# Patient Record
Sex: MALE | Race: WHITE | Employment: FULL TIME | URBAN - METROPOLITAN AREA
[De-identification: names, ages, dates, MRNs, and addresses within clinical notes are randomized per-mention and may not be internally consistent; named-entity substitution may affect disease eponyms.]

---

## 2021-08-17 ENCOUNTER — TRANSCRIBE ORDER (OUTPATIENT)
Dept: SCHEDULING | Age: 56
End: 2021-08-17

## 2021-08-17 DIAGNOSIS — M16.12 PRIMARY OSTEOARTHRITIS OF LEFT HIP: ICD-10-CM

## 2021-08-17 DIAGNOSIS — M25.552 LEFT HIP PAIN: Primary | ICD-10-CM

## 2021-12-14 ENCOUNTER — TRANSCRIBE ORDER (OUTPATIENT)
Dept: SCHEDULING | Age: 56
End: 2021-12-14

## 2022-01-19 ENCOUNTER — HOSPITAL ENCOUNTER (OUTPATIENT)
Dept: CT IMAGING | Age: 57
Discharge: HOME | End: 2022-01-19
Attending: ORTHOPAEDIC SURGERY
Payer: COMMERCIAL

## 2022-01-19 DIAGNOSIS — M16.12 PRIMARY OSTEOARTHRITIS OF LEFT HIP: ICD-10-CM

## 2022-01-19 DIAGNOSIS — M25.552 LEFT HIP PAIN: ICD-10-CM

## 2022-01-19 PROCEDURE — 73700 CT LOWER EXTREMITY W/O DYE: CPT

## 2022-03-14 ENCOUNTER — HOSPITAL ENCOUNTER (OUTPATIENT)
Dept: PREADMISSION TESTING | Age: 57
Discharge: HOME | End: 2022-03-14
Payer: COMMERCIAL

## 2022-03-14 VITALS
OXYGEN SATURATION: 97 % | TEMPERATURE: 97.5 F | HEIGHT: 71 IN | SYSTOLIC BLOOD PRESSURE: 118 MMHG | WEIGHT: 198.41 LBS | RESPIRATION RATE: 16 BRPM | BODY MASS INDEX: 27.78 KG/M2 | HEART RATE: 54 BPM | DIASTOLIC BLOOD PRESSURE: 74 MMHG

## 2022-03-14 LAB
ABO + RH BLD: NORMAL
ALBUMIN SERPL-MCNC: 4.1 G/DL (ref 3.5–5)
ALBUMIN/GLOB SERPL: 1.2 {RATIO} (ref 1.1–2.2)
ALP SERPL-CCNC: 81 U/L (ref 45–117)
ALT SERPL-CCNC: 42 U/L (ref 12–78)
ANION GAP SERPL CALC-SCNC: 4 MMOL/L (ref 5–15)
APPEARANCE UR: CLEAR
AST SERPL-CCNC: 20 U/L (ref 15–37)
ATRIAL RATE: 47 BPM
BACTERIA URNS QL MICRO: NEGATIVE /HPF
BASOPHILS # BLD: 0.1 K/UL (ref 0–0.1)
BASOPHILS NFR BLD: 1 % (ref 0–1)
BILIRUB SERPL-MCNC: 0.6 MG/DL (ref 0.2–1)
BILIRUB UR QL: NEGATIVE
BLOOD GROUP ANTIBODIES SERPL: NORMAL
BUN SERPL-MCNC: 13 MG/DL (ref 6–20)
BUN/CREAT SERPL: 10 (ref 12–20)
CALCIUM SERPL-MCNC: 9.3 MG/DL (ref 8.5–10.1)
CALCULATED P AXIS, ECG09: 50 DEGREES
CALCULATED R AXIS, ECG10: 10 DEGREES
CALCULATED T AXIS, ECG11: 40 DEGREES
CHLORIDE SERPL-SCNC: 105 MMOL/L (ref 97–108)
CO2 SERPL-SCNC: 32 MMOL/L (ref 21–32)
COLOR UR: ABNORMAL
CREAT SERPL-MCNC: 1.28 MG/DL (ref 0.7–1.3)
CRP SERPL-MCNC: <0.29 MG/DL (ref 0–0.6)
DIAGNOSIS, 93000: NORMAL
DIFFERENTIAL METHOD BLD: NORMAL
EOSINOPHIL # BLD: 0.1 K/UL (ref 0–0.4)
EOSINOPHIL NFR BLD: 1 % (ref 0–7)
EPITH CASTS URNS QL MICRO: ABNORMAL /LPF
ERYTHROCYTE [DISTWIDTH] IN BLOOD BY AUTOMATED COUNT: 12.7 % (ref 11.5–14.5)
ERYTHROCYTE [SEDIMENTATION RATE] IN BLOOD: 10 MM/HR (ref 0–20)
EST. AVERAGE GLUCOSE BLD GHB EST-MCNC: 117 MG/DL
GLOBULIN SER CALC-MCNC: 3.4 G/DL (ref 2–4)
GLUCOSE SERPL-MCNC: 90 MG/DL (ref 65–100)
GLUCOSE UR STRIP.AUTO-MCNC: NEGATIVE MG/DL
HBA1C MFR BLD: 5.7 % (ref 4–5.6)
HCT VFR BLD AUTO: 47 % (ref 36.6–50.3)
HGB BLD-MCNC: 15.2 G/DL (ref 12.1–17)
HGB UR QL STRIP: NEGATIVE
HYALINE CASTS URNS QL MICRO: ABNORMAL /LPF (ref 0–2)
IMM GRANULOCYTES # BLD AUTO: 0 K/UL (ref 0–0.04)
IMM GRANULOCYTES NFR BLD AUTO: 0 % (ref 0–0.5)
KETONES UR QL STRIP.AUTO: ABNORMAL MG/DL
LEUKOCYTE ESTERASE UR QL STRIP.AUTO: NEGATIVE
LYMPHOCYTES # BLD: 1.1 K/UL (ref 0.8–3.5)
LYMPHOCYTES NFR BLD: 21 % (ref 12–49)
MCH RBC QN AUTO: 30.8 PG (ref 26–34)
MCHC RBC AUTO-ENTMCNC: 32.3 G/DL (ref 30–36.5)
MCV RBC AUTO: 95.3 FL (ref 80–99)
MONOCYTES # BLD: 0.5 K/UL (ref 0–1)
MONOCYTES NFR BLD: 10 % (ref 5–13)
NEUTS SEG # BLD: 3.6 K/UL (ref 1.8–8)
NEUTS SEG NFR BLD: 67 % (ref 32–75)
NITRITE UR QL STRIP.AUTO: NEGATIVE
NRBC # BLD: 0 K/UL (ref 0–0.01)
NRBC BLD-RTO: 0 PER 100 WBC
P-R INTERVAL, ECG05: 188 MS
PH UR STRIP: 5.5 [PH] (ref 5–8)
PLATELET # BLD AUTO: 230 K/UL (ref 150–400)
PMV BLD AUTO: 10.3 FL (ref 8.9–12.9)
POTASSIUM SERPL-SCNC: 4 MMOL/L (ref 3.5–5.1)
PROT SERPL-MCNC: 7.5 G/DL (ref 6.4–8.2)
PROT UR STRIP-MCNC: NEGATIVE MG/DL
Q-T INTERVAL, ECG07: 532 MS
QRS DURATION, ECG06: 90 MS
QTC CALCULATION (BEZET), ECG08: 470 MS
RBC # BLD AUTO: 4.93 M/UL (ref 4.1–5.7)
RBC #/AREA URNS HPF: ABNORMAL /HPF (ref 0–5)
SODIUM SERPL-SCNC: 141 MMOL/L (ref 136–145)
SP GR UR REFRACTOMETRY: 1.02 (ref 1–1.03)
SPECIMEN EXP DATE BLD: NORMAL
UA: UC IF INDICATED,UAUC: ABNORMAL
UROBILINOGEN UR QL STRIP.AUTO: 1 EU/DL (ref 0.2–1)
VENTRICULAR RATE, ECG03: 47 BPM
WBC # BLD AUTO: 5.3 K/UL (ref 4.1–11.1)
WBC URNS QL MICRO: ABNORMAL /HPF (ref 0–4)

## 2022-03-14 PROCEDURE — 83036 HEMOGLOBIN GLYCOSYLATED A1C: CPT

## 2022-03-14 PROCEDURE — 86140 C-REACTIVE PROTEIN: CPT

## 2022-03-14 PROCEDURE — 81001 URINALYSIS AUTO W/SCOPE: CPT

## 2022-03-14 PROCEDURE — 86900 BLOOD TYPING SEROLOGIC ABO: CPT

## 2022-03-14 PROCEDURE — 36415 COLL VENOUS BLD VENIPUNCTURE: CPT

## 2022-03-14 PROCEDURE — 93005 ELECTROCARDIOGRAM TRACING: CPT

## 2022-03-14 PROCEDURE — 85025 COMPLETE CBC W/AUTO DIFF WBC: CPT

## 2022-03-14 PROCEDURE — 85652 RBC SED RATE AUTOMATED: CPT

## 2022-03-14 PROCEDURE — 84466 ASSAY OF TRANSFERRIN: CPT

## 2022-03-14 PROCEDURE — 80053 COMPREHEN METABOLIC PANEL: CPT

## 2022-03-14 RX ORDER — LEVOTHYROXINE SODIUM 50 UG/1
75 TABLET ORAL
COMMUNITY

## 2022-03-14 RX ORDER — LOSARTAN POTASSIUM 25 MG/1
25 TABLET ORAL EVERY MORNING
COMMUNITY

## 2022-03-14 RX ORDER — AMIODARONE HYDROCHLORIDE 200 MG/1
200 TABLET ORAL 2 TIMES DAILY
COMMUNITY

## 2022-03-14 NOTE — H&P
History and Physical    Patient: Ericka Disla MRN: [id number]  SSN: [id number]    YOB: 1965  Age: 59 y.o. Sex: male      CC: Left hip pain    Subjective:      Ericka Disla is a 59 y.o. male referred for pre-operative evaluation by Dr. Dontrell Fleming for surgery on 3/25/22. Gloria Ellakeren notes he has had hip pain for at least two years. He does yoga and is an avid golfer. His hip pain is interfering with both activities. Pain is in his groin and hip. Denies falls. He has tried and failed conservative measures. Pain Scale: 3/10    He is followed by Dr. Meron Worthington for his hx of AFIB. He is well controlled and is taking Xarelto. He notes last visit was in December. The patient was evaluated in the surgeon's office and it was determined that the most appropriate plan of care is to proceed with surgical intervention. Patient's PCP Supriya Marrufo MD    Past Medical History:   Diagnosis Date    A-fib [facility])     Anticoagulated     Xarelto- followed by Dr. Wendy Lombardi COVID-19 01/24/2022    COVID-19 vaccine series completed     Plus booster    HTN (hypertension)     Hypothyroidism     Seizure ([facility] [address] 2010    None since- No known cause     Past Surgical History:   Procedure Laterality Date    HX HERNIA REPAIR      HX VASECTOMY        No family history on file. Social History     Tobacco Use    Smoking status: Never Smoker    Smokeless tobacco: Never Used   Substance Use Topics    Alcohol use: Yes     Alcohol/week: 3.0 standard drinks     Types: 3 Cans of beer per week    Drug use: No      Prior to Admission medications    Medication Sig Start Date End Date Taking? Authorizing Provider   amiodarone (CORDARONE) 200 mg tablet Take 200 mg by mouth two (2) times a day. Yes Provider, Historical   losartan (COZAAR) 25 mg tablet Take 25 mg by mouth Every morning. Yes Provider, Historical   MULTIVITAMIN PO Take 1 Tablet by mouth every other day.    Yes Provider, Historical   rivaroxaban (XARELTO) 20 mg tab tablet Take 20 mg by mouth daily (with breakfast). Yes Provider, Historical   levothyroxine (SYNTHROID) 50 mcg tablet Take 50 mcg by mouth Daily (before breakfast). Yes Provider, Historical        No Known Allergies    Review of Systems:  Constitutional: Negative for chills and fever  HENT: Negative for congestion and sore throat  Eyes: negative for blurred vision and double vision  Respiratory: Negative for cough, shortness of breath and wheezing  Mouth: Negative for loose, broken or chipped teeth. Cardiovascular: Negative for chest pain and palpitations  Gastrointestinal: Negative for abdominal pain, constipation, diarrhea and nausea  Genitourinary: Negative for dysuria and hematuria  Musculoskeletal: Left hip pain  Skin: Negative for rash, open wounds.    Neurological: Negative for dizziness, tremors and headaches  Psychiatric: Negative for anxiety    Objective:     Vitals:    03/14/22 0823   BP: 118/74   Pulse: (!) 54   Resp: 16   Temp: 97.5 °F (36.4 °C)   SpO2: 97%   Weight: 90 kg (198 lb 6.6 oz)   Height: 5' 11\" (1.803 m)        Physical Exam:  General Appearance: Alert, Well Appearing and in no distress  Mental Status: Normal mood, behavior, speech and dress  Neck: Normal appearance externally  Ears: External canal no drainage  Nose: Normal external appearance and no drainage   Chest: Clear to auscultation, no wheezes, rales or rhonchi  Heart: Bradycardia, regular rhythm, no murmurs, rubs, clicks or gallops  Skin: Normal color, no lesions externally  Abdomen: Not examined  Neuro: Not examined  Musculoskeletal: Gait antalgic    Mets >4- plays golf and does yoga    Recent Results (from the past 168 hour(s))   C REACTIVE PROTEIN, QT    Collection Time: 03/14/22  9:12 AM   Result Value Ref Range    C-Reactive protein <0.29 0.00 - 0.60 mg/dL   CBC WITH AUTOMATED DIFF    Collection Time: 03/14/22  9:12 AM   Result Value Ref Range    WBC 5.3 4.1 - 11.1 K/uL    RBC 4.93 4.10 - 5.70 M/uL HGB 15.2 12.1 - 17.0 g/dL    HCT 47.0 36.6 - 50.3 %    MCV 95.3 80.0 - 99.0 FL    MCH 30.8 26.0 - 34.0 PG    MCHC 32.3 30.0 - 36.5 g/dL    RDW 12.7 11.5 - 14.5 %    PLATELET 165 953 - 365 K/uL    MPV 10.3 8.9 - 12.9 FL    NRBC 0.0 0  WBC    ABSOLUTE NRBC 0.00 0.00 - 0.01 K/uL    NEUTROPHILS 67 32 - 75 %    LYMPHOCYTES 21 12 - 49 %    MONOCYTES 10 5 - 13 %    EOSINOPHILS 1 0 - 7 %    BASOPHILS 1 0 - 1 %    IMMATURE GRANULOCYTES 0 0.0 - 0.5 %    ABS. NEUTROPHILS 3.6 1.8 - 8.0 K/UL    ABS. LYMPHOCYTES 1.1 0.8 - 3.5 K/UL    ABS. MONOCYTES 0.5 0.0 - 1.0 K/UL    ABS. EOSINOPHILS 0.1 0.0 - 0.4 K/UL    ABS. BASOPHILS 0.1 0.0 - 0.1 K/UL    ABS. IMM. GRANS. 0.0 0.00 - 0.04 K/UL    DF AUTOMATED     METABOLIC PANEL, COMPREHENSIVE    Collection Time: 03/14/22  9:12 AM   Result Value Ref Range    Sodium 141 136 - 145 mmol/L    Potassium 4.0 3.5 - 5.1 mmol/L    Chloride 105 97 - 108 mmol/L    CO2 32 21 - 32 mmol/L    Anion gap 4 (L) 5 - 15 mmol/L    Glucose 90 65 - 100 mg/dL    BUN 13 6 - 20 MG/DL    Creatinine 1.28 0.70 - 1.30 MG/DL    BUN/Creatinine ratio 10 (L) 12 - 20      GFR est AA >60 >60 ml/min/1.73m2    GFR est non-AA 58 (L) >60 ml/min/1.73m2    Calcium 9.3 8.5 - 10.1 MG/DL    Bilirubin, total 0.6 0.2 - 1.0 MG/DL    ALT (SGPT) 42 12 - 78 U/L    AST (SGOT) 20 15 - 37 U/L    Alk.  phosphatase 81 45 - 117 U/L    Protein, total 7.5 6.4 - 8.2 g/dL    Albumin 4.1 3.5 - 5.0 g/dL    Globulin 3.4 2.0 - 4.0 g/dL    A-G Ratio 1.2 1.1 - 2.2     HEMOGLOBIN A1C WITH EAG    Collection Time: 03/14/22  9:12 AM   Result Value Ref Range    Hemoglobin A1c 5.7 (H) 4.0 - 5.6 %    Est. average glucose 117 mg/dL   CULTURE, MRSA    Collection Time: 03/14/22  9:12 AM    Specimen: Nares; Nasal   Result Value Ref Range    Special Requests: NO SPECIAL REQUESTS      Culture result: MRSA NOT PRESENT      Culture result:        Screening of patient nares for MRSA is for surveillance purposes and, if positive, to facilitate isolation considerations in high risk settings. It is not intended for automatic decolonization interventions per se as regimens are not sufficiently effective to warrant routine use. SED RATE (ESR)    Collection Time: 03/14/22  9:12 AM   Result Value Ref Range    Sed rate, automated 10 0 - 20 mm/hr   TRANSFERRIN    Collection Time: 03/14/22  9:12 AM   Result Value Ref Range    Transferrin 230 177 - 329 mg/dL   URINALYSIS W/ REFLEX CULTURE    Collection Time: 03/14/22  9:12 AM    Specimen: Urine   Result Value Ref Range    Color YELLOW/STRAW      Appearance CLEAR CLEAR      Specific gravity 1.021 1.003 - 1.030      pH (UA) 5.5 5.0 - 8.0      Protein Negative NEG mg/dL    Glucose Negative NEG mg/dL    Ketone TRACE (A) NEG mg/dL    Bilirubin Negative NEG      Blood Negative NEG      Urobilinogen 1.0 0.2 - 1.0 EU/dL    Nitrites Negative NEG      Leukocyte Esterase Negative NEG      UA:UC IF INDICATED CULTURE NOT INDICATED BY UA RESULT CNI      WBC 0-4 0 - 4 /hpf    RBC 0-5 0 - 5 /hpf    Epithelial cells FEW FEW /lpf    Bacteria Negative NEG /hpf    Hyaline cast 0-2 0 - 2 /lpf   TYPE & SCREEN    Collection Time: 03/14/22  9:12 AM   Result Value Ref Range    Crossmatch Expiration 03/28/2022,2359     ABO/Rh(D) Carri Ricks POSITIVE     Antibody screen NEG    EKG, 12 LEAD, INITIAL    Collection Time: 03/14/22  9:38 AM   Result Value Ref Range    Ventricular Rate 47 BPM    Atrial Rate 47 BPM    P-R Interval 188 ms    QRS Duration 90 ms    Q-T Interval 532 ms    QTC Calculation (Bezet) 470 ms    Calculated P Axis 50 degrees    Calculated R Axis 10 degrees    Calculated T Axis 40 degrees    Diagnosis       Sinus bradycardia  Otherwise normal ECG  When compared with ECG of 7/22/13  No significant change  Confirmed by Roselia Bowen MD., Justin Marmolejo ([id number]) on 3/14/2022 9:02:53 PM           Assessment:     OA Left Hip  Hx of AFIB on Xarelto  Pre-Operative Evaluation    Plan:     LTH  MRSA negative  Labs and EKG reviewed.      Xarelto: I have asked Annette Almonte to hold his Xarelto three days prior to surgery and we will verify with cardiology.        Signed By: Sulaiman Chambers NP     March 16, 2022

## 2022-03-14 NOTE — PERIOP NOTES
[address] [address] [facility]     PRE-ADMISSION TESTING    [phone]   Surgery Date: 3/25/22     Is surgery arrival time given by surgeon? NO  If NO, [address] [facility] staff will call you between 3 and 7pm the day before your surgery with your arrival time. (If your surgery is on a Monday, we will call you the Friday before.)    Call [phone] after 7pm Monday-Friday if you did not receive this call. INSTRUCTIONS BEFORE YOUR SURGERY   When You  Arrive Arrive at the [address] [facility] on the day of your surgery  Have your insurance card, photo ID, and any copayment (if needed)   Food   and   Drink NO food or drink after midnight the night before surgery    This means NO water, gum, mints, coffee, juice, etc.  No alcohol (beer, wine, liquor) 24 hours before and after surgery   Medications to   TAKE   Morning of Surgery MEDICATIONS TO TAKE THE MORNING OF SURGERY WITH A SIP OF WATER:    Amiodarone    Levothyroxine    Medications  To  STOP      7 days before surgery  Non-Steroidal anti-inflammatory Drugs (NSAID's): for example, Ibuprofen (Advil, Motrin), Naproxen (Aleve)   Aspirin, if taking for pain    Herbal supplements, vitamins, and fish oil   Other:  (Pain medications not listed above, including Tylenol may be taken)   Blood  Thinners   Stop Xarelto 3 days prior to surgery per our NP Deepak Thayer. We will discuss with your cardiologist and call you if he advises otherwise. Bathing Clothing  Jewelry  Valuables      If you shower the morning of surgery, please do not apply anything to your skin (lotions, powders, deodorant, or makeup, especially mascara)   Follow Chlorhexidine [employer] body wash instructions provided to you during PAT appointment. Begin 3 days prior to surgery.    Do not shave or trim anywhere 24 hours before surgery   Wear your hair loose or down; no pony-tails, buns, or metal hair clips   Wear loose, comfortable, clean clothes   Wear glasses instead of contacts  [address], valuables, and jewelry, including body piercings, at home   If you were given an [employer], bring it on day of surgery. Going Home - or Spending the Night  SAME-DAY SURGERY: You must have a responsible adult drive you home and stay with you 24 hours after surgery   ADMITS: If your doctor is keeping you in the hospital after surgery, leave personal belongings/luggage in your car until you have a hospital room number. Hospital discharge time is 12 noon  Drivers must be here before 12 noon unless you are told differently   Special Instructions        Follow all instructions so your surgery wont be cancelled. Please, be on time. If a situation occurs and you are delayed the day of surgery, call [phone] or [phone]. If your physical condition changes (like a fever, cold, flu, etc.) call your surgeon. Home medication(s) reviewed and verified verbally with list  during PAT appointment. The patient was contacted by in person    The patient verbalizes understanding of all instructions and does not need reinforcement.

## 2022-03-15 LAB
BACTERIA SPEC CULT: NORMAL
BACTERIA SPEC CULT: NORMAL
SERVICE CMNT-IMP: NORMAL
TRANSFERRIN SERPL-MCNC: 230 MG/DL (ref 177–329)

## 2022-03-22 NOTE — PERIOP NOTES
Xarelto plan received from Dr. Nguyen Clock office. Notified patient to hold Xarelto - verbalized understanding. Will restart after surgery per surgeon instructions.

## 2022-03-24 ENCOUNTER — ANESTHESIA EVENT (OUTPATIENT)
Dept: SURGERY | Age: 57
End: 2022-03-24
Payer: COMMERCIAL

## 2022-03-25 ENCOUNTER — APPOINTMENT (OUTPATIENT)
Dept: GENERAL RADIOLOGY | Age: 57
End: 2022-03-25
Attending: PHYSICIAN ASSISTANT
Payer: COMMERCIAL

## 2022-03-25 ENCOUNTER — HOSPITAL ENCOUNTER (OUTPATIENT)
Age: 57
Setting detail: OBSERVATION
Discharge: HOME | End: 2022-03-26
Attending: ORTHOPAEDIC SURGERY | Admitting: ORTHOPAEDIC SURGERY
Payer: COMMERCIAL

## 2022-03-25 ENCOUNTER — ANESTHESIA (OUTPATIENT)
Dept: SURGERY | Age: 57
End: 2022-03-25
Payer: COMMERCIAL

## 2022-03-25 DIAGNOSIS — M16.12 ARTHRITIS OF LEFT HIP: Primary | ICD-10-CM

## 2022-03-25 DIAGNOSIS — I48.91 ATRIAL FIBRILLATION, UNSPECIFIED TYPE (HCC): ICD-10-CM

## 2022-03-25 DIAGNOSIS — I95.1 ORTHOSTATIC HYPOTENSION: ICD-10-CM

## 2022-03-25 PROCEDURE — 74011250637 HC RX REV CODE- 250/637: Performed by: PHYSICIAN ASSISTANT

## 2022-03-25 PROCEDURE — 74011250636 HC RX REV CODE- 250/636: Performed by: ANESTHESIOLOGY

## 2022-03-25 PROCEDURE — 74011000250 HC RX REV CODE- 250: Performed by: ORTHOPAEDIC SURGERY

## 2022-03-25 PROCEDURE — G0378 HOSPITAL OBSERVATION PER HR: HCPCS

## 2022-03-25 PROCEDURE — 2709999900 HC NON-CHARGEABLE SUPPLY: Performed by: ORTHOPAEDIC SURGERY

## 2022-03-25 PROCEDURE — 76210000036 HC AMBSU PH I REC 1.5 TO 2 HR: Performed by: ORTHOPAEDIC SURGERY

## 2022-03-25 PROCEDURE — 74011000250 HC RX REV CODE- 250: Performed by: NURSE ANESTHETIST, CERTIFIED REGISTERED

## 2022-03-25 PROCEDURE — 74011250636 HC RX REV CODE- 250/636: Performed by: ORTHOPAEDIC SURGERY

## 2022-03-25 PROCEDURE — 77030040922 HC BLNKT HYPOTHRM STRY -A

## 2022-03-25 PROCEDURE — 77030013079 HC BLNKT BAIR HGGR 3M -A: Performed by: NURSE ANESTHETIST, CERTIFIED REGISTERED

## 2022-03-25 PROCEDURE — 74011250636 HC RX REV CODE- 250/636: Performed by: NURSE ANESTHETIST, CERTIFIED REGISTERED

## 2022-03-25 PROCEDURE — 74011000272 HC RX REV CODE- 272: Performed by: ORTHOPAEDIC SURGERY

## 2022-03-25 PROCEDURE — 97161 PT EVAL LOW COMPLEX 20 MIN: CPT

## 2022-03-25 PROCEDURE — 77030040361 HC SLV COMPR DVT MDII -B

## 2022-03-25 PROCEDURE — 77030020788: Performed by: ORTHOPAEDIC SURGERY

## 2022-03-25 PROCEDURE — 77030018723 HC ELCTRD BLD COVD -A: Performed by: ORTHOPAEDIC SURGERY

## 2022-03-25 PROCEDURE — 77030006835 HC BLD SAW SAG STRY -B: Performed by: ORTHOPAEDIC SURGERY

## 2022-03-25 PROCEDURE — 74011250636 HC RX REV CODE- 250/636: Performed by: PHYSICIAN ASSISTANT

## 2022-03-25 PROCEDURE — 77030010507 HC ADH SKN DERMBND J&J -B: Performed by: ORTHOPAEDIC SURGERY

## 2022-03-25 PROCEDURE — C1776 JOINT DEVICE (IMPLANTABLE): HCPCS | Performed by: ORTHOPAEDIC SURGERY

## 2022-03-25 PROCEDURE — 97530 THERAPEUTIC ACTIVITIES: CPT

## 2022-03-25 PROCEDURE — 76030000021 HC AMB SURG 2 TO 2.5 HR INTENSV-TIER 1: Performed by: ORTHOPAEDIC SURGERY

## 2022-03-25 PROCEDURE — 77030031139 HC SUT VCRL2 J&J -A: Performed by: ORTHOPAEDIC SURGERY

## 2022-03-25 PROCEDURE — 77030007866 HC KT SPN ANES BBMI -B: Performed by: ANESTHESIOLOGY

## 2022-03-25 PROCEDURE — 74011000250 HC RX REV CODE- 250: Performed by: ANESTHESIOLOGY

## 2022-03-25 PROCEDURE — 51798 US URINE CAPACITY MEASURE: CPT

## 2022-03-25 PROCEDURE — 77030035236 HC SUT PDS STRATFX BARB J&J -B: Performed by: ORTHOPAEDIC SURGERY

## 2022-03-25 PROCEDURE — 77030029829 HC TIB INST CKPNT DISP STRY -B: Performed by: ORTHOPAEDIC SURGERY

## 2022-03-25 PROCEDURE — 77030041075 HC DRSG AG OPTIFRM MDII -B: Performed by: ORTHOPAEDIC SURGERY

## 2022-03-25 PROCEDURE — 77030029821: Performed by: ORTHOPAEDIC SURGERY

## 2022-03-25 PROCEDURE — 77030002933 HC SUT MCRYL J&J -A: Performed by: ORTHOPAEDIC SURGERY

## 2022-03-25 PROCEDURE — 76060000064 HC AMB SURG ANES 2 TO 2.5 HR: Performed by: ORTHOPAEDIC SURGERY

## 2022-03-25 PROCEDURE — 74011250637 HC RX REV CODE- 250/637: Performed by: ANESTHESIOLOGY

## 2022-03-25 PROCEDURE — 74011000250 HC RX REV CODE- 250: Performed by: PHYSICIAN ASSISTANT

## 2022-03-25 PROCEDURE — 74011250637 HC RX REV CODE- 250/637: Performed by: ORTHOPAEDIC SURGERY

## 2022-03-25 PROCEDURE — 77030036660

## 2022-03-25 PROCEDURE — 77030038023 HC PIN FIX MAKO STRY -B: Performed by: ORTHOPAEDIC SURGERY

## 2022-03-25 PROCEDURE — 72170 X-RAY EXAM OF PELVIS: CPT

## 2022-03-25 DEVICE — 6.5MM LOW PROFILE HEX SCREW 30MM
Type: IMPLANTABLE DEVICE | Site: HIP | Status: FUNCTIONAL
Brand: TRIDENT II

## 2022-03-25 DEVICE — LINER- CEMENTLESS
Type: IMPLANTABLE DEVICE | Site: HIP | Status: FUNCTIONAL
Brand: MDM

## 2022-03-25 DEVICE — CERAMIC V40 FEMORAL HEAD
Type: IMPLANTABLE DEVICE | Site: HIP | Status: FUNCTIONAL
Brand: BIOLOX

## 2022-03-25 DEVICE — X3 INSERT FOR ADM/ MDM
Type: IMPLANTABLE DEVICE | Site: HIP | Status: FUNCTIONAL
Brand: X3

## 2022-03-25 DEVICE — HIP H3 TOT ADV DUAL MOB -- IMPL CAPPED H3: Type: IMPLANTABLE DEVICE | Status: FUNCTIONAL

## 2022-03-25 DEVICE — TRIDENT II TRITANIUM CLUSTER 54E
Type: IMPLANTABLE DEVICE | Site: HIP | Status: FUNCTIONAL
Brand: TRIDENT II

## 2022-03-25 DEVICE — 127 DEGREE NECK ANGLE HIP STEM
Type: IMPLANTABLE DEVICE | Site: HIP | Status: FUNCTIONAL
Brand: ACCOLADE

## 2022-03-25 RX ORDER — OXYCODONE HCL 10 MG/1
20 TABLET, FILM COATED, EXTENDED RELEASE ORAL ONCE
Status: COMPLETED | OUTPATIENT
Start: 2022-03-25 | End: 2022-03-25

## 2022-03-25 RX ORDER — OXYCODONE HYDROCHLORIDE 5 MG/1
10 TABLET ORAL
Status: DISCONTINUED | OUTPATIENT
Start: 2022-03-25 | End: 2022-03-26 | Stop reason: HOSPADM

## 2022-03-25 RX ORDER — HYDROMORPHONE HYDROCHLORIDE 1 MG/ML
.25-1 INJECTION, SOLUTION INTRAMUSCULAR; INTRAVENOUS; SUBCUTANEOUS
Status: DISCONTINUED | OUTPATIENT
Start: 2022-03-25 | End: 2022-03-25 | Stop reason: HOSPADM

## 2022-03-25 RX ORDER — SODIUM CHLORIDE, SODIUM LACTATE, POTASSIUM CHLORIDE, CALCIUM CHLORIDE 600; 310; 30; 20 MG/100ML; MG/100ML; MG/100ML; MG/100ML
100 INJECTION, SOLUTION INTRAVENOUS CONTINUOUS
Status: DISCONTINUED | OUTPATIENT
Start: 2022-03-25 | End: 2022-03-25 | Stop reason: HOSPADM

## 2022-03-25 RX ORDER — ACETAMINOPHEN 500 MG
975 TABLET ORAL
Qty: 60 TABLET | Refills: 1 | Status: SHIPPED | OUTPATIENT
Start: 2022-03-25

## 2022-03-25 RX ORDER — ACETAMINOPHEN 325 MG/1
650 TABLET ORAL EVERY 6 HOURS
Status: DISCONTINUED | OUTPATIENT
Start: 2022-03-25 | End: 2022-03-26 | Stop reason: HOSPADM

## 2022-03-25 RX ORDER — ONDANSETRON 8 MG/1
4 TABLET, ORALLY DISINTEGRATING ORAL
Qty: 30 TABLET | Refills: 0 | Status: SHIPPED | OUTPATIENT
Start: 2022-03-25

## 2022-03-25 RX ORDER — FACIAL-BODY WIPES
10 EACH TOPICAL DAILY PRN
Status: DISCONTINUED | OUTPATIENT
Start: 2022-03-27 | End: 2022-03-26 | Stop reason: HOSPADM

## 2022-03-25 RX ORDER — OXYCODONE HYDROCHLORIDE 5 MG/1
10 TABLET ORAL
Status: DISCONTINUED | OUTPATIENT
Start: 2022-03-25 | End: 2022-03-25 | Stop reason: HOSPADM

## 2022-03-25 RX ORDER — SODIUM CHLORIDE, SODIUM LACTATE, POTASSIUM CHLORIDE, CALCIUM CHLORIDE 600; 310; 30; 20 MG/100ML; MG/100ML; MG/100ML; MG/100ML
125 INJECTION, SOLUTION INTRAVENOUS CONTINUOUS
Status: DISCONTINUED | OUTPATIENT
Start: 2022-03-25 | End: 2022-03-25 | Stop reason: HOSPADM

## 2022-03-25 RX ORDER — BUPIVACAINE HYDROCHLORIDE 5 MG/ML
INJECTION, SOLUTION EPIDURAL; INTRACAUDAL AS NEEDED
Status: DISCONTINUED | OUTPATIENT
Start: 2022-03-25 | End: 2022-03-25 | Stop reason: HOSPADM

## 2022-03-25 RX ORDER — DIPHENHYDRAMINE HYDROCHLORIDE 50 MG/ML
12.5 INJECTION, SOLUTION INTRAMUSCULAR; INTRAVENOUS AS NEEDED
Status: DISCONTINUED | OUTPATIENT
Start: 2022-03-25 | End: 2022-03-25 | Stop reason: HOSPADM

## 2022-03-25 RX ORDER — LIDOCAINE HYDROCHLORIDE 20 MG/ML
INJECTION, SOLUTION INFILTRATION; PERINEURAL AS NEEDED
Status: DISCONTINUED | OUTPATIENT
Start: 2022-03-25 | End: 2022-03-25 | Stop reason: HOSPADM

## 2022-03-25 RX ORDER — OXYCODONE HYDROCHLORIDE 5 MG/1
5 TABLET ORAL AS NEEDED
Status: DISCONTINUED | OUTPATIENT
Start: 2022-03-25 | End: 2022-03-25 | Stop reason: HOSPADM

## 2022-03-25 RX ORDER — HYDROMORPHONE HYDROCHLORIDE 1 MG/ML
0.5 INJECTION, SOLUTION INTRAMUSCULAR; INTRAVENOUS; SUBCUTANEOUS
Status: ACTIVE | OUTPATIENT
Start: 2022-03-25 | End: 2022-03-26

## 2022-03-25 RX ORDER — ONDANSETRON 2 MG/ML
4 INJECTION INTRAMUSCULAR; INTRAVENOUS
Status: ACTIVE | OUTPATIENT
Start: 2022-03-25 | End: 2022-03-26

## 2022-03-25 RX ORDER — SODIUM CHLORIDE 0.9 % (FLUSH) 0.9 %
5-40 SYRINGE (ML) INJECTION EVERY 8 HOURS
Status: DISCONTINUED | OUTPATIENT
Start: 2022-03-25 | End: 2022-03-26 | Stop reason: HOSPADM

## 2022-03-25 RX ORDER — FENTANYL CITRATE 50 UG/ML
25 INJECTION, SOLUTION INTRAMUSCULAR; INTRAVENOUS
Status: DISCONTINUED | OUTPATIENT
Start: 2022-03-25 | End: 2022-03-25 | Stop reason: HOSPADM

## 2022-03-25 RX ORDER — FAMOTIDINE 20 MG/1
20 TABLET, FILM COATED ORAL 2 TIMES DAILY
Status: DISCONTINUED | OUTPATIENT
Start: 2022-03-25 | End: 2022-03-26 | Stop reason: HOSPADM

## 2022-03-25 RX ORDER — PROPOFOL 10 MG/ML
INJECTION, EMULSION INTRAVENOUS
Status: DISCONTINUED | OUTPATIENT
Start: 2022-03-25 | End: 2022-03-25 | Stop reason: HOSPADM

## 2022-03-25 RX ORDER — FLUMAZENIL 0.1 MG/ML
0.2 INJECTION INTRAVENOUS
Status: DISCONTINUED | OUTPATIENT
Start: 2022-03-25 | End: 2022-03-25 | Stop reason: HOSPADM

## 2022-03-25 RX ORDER — SODIUM CHLORIDE 0.9 % (FLUSH) 0.9 %
5-40 SYRINGE (ML) INJECTION AS NEEDED
Status: DISCONTINUED | OUTPATIENT
Start: 2022-03-25 | End: 2022-03-25 | Stop reason: HOSPADM

## 2022-03-25 RX ORDER — LIDOCAINE HYDROCHLORIDE 10 MG/ML
0.1 INJECTION, SOLUTION EPIDURAL; INFILTRATION; INTRACAUDAL; PERINEURAL AS NEEDED
Status: DISCONTINUED | OUTPATIENT
Start: 2022-03-25 | End: 2022-03-25 | Stop reason: HOSPADM

## 2022-03-25 RX ORDER — NALOXONE HYDROCHLORIDE 0.4 MG/ML
0.2 INJECTION, SOLUTION INTRAMUSCULAR; INTRAVENOUS; SUBCUTANEOUS
Status: DISCONTINUED | OUTPATIENT
Start: 2022-03-25 | End: 2022-03-25 | Stop reason: HOSPADM

## 2022-03-25 RX ORDER — OXYCODONE HYDROCHLORIDE 5 MG/1
5 TABLET ORAL
Status: DISCONTINUED | OUTPATIENT
Start: 2022-03-25 | End: 2022-03-26 | Stop reason: HOSPADM

## 2022-03-25 RX ORDER — TRANEXAMIC ACID 100 MG/ML
INJECTION, SOLUTION INTRAVENOUS AS NEEDED
Status: DISCONTINUED | OUTPATIENT
Start: 2022-03-25 | End: 2022-03-25 | Stop reason: HOSPADM

## 2022-03-25 RX ORDER — AMOXICILLIN 250 MG
1 CAPSULE ORAL 2 TIMES DAILY
Status: DISCONTINUED | OUTPATIENT
Start: 2022-03-25 | End: 2022-03-26 | Stop reason: HOSPADM

## 2022-03-25 RX ORDER — NALOXONE HYDROCHLORIDE 0.4 MG/ML
0.4 INJECTION, SOLUTION INTRAMUSCULAR; INTRAVENOUS; SUBCUTANEOUS AS NEEDED
Status: DISCONTINUED | OUTPATIENT
Start: 2022-03-25 | End: 2022-03-26 | Stop reason: HOSPADM

## 2022-03-25 RX ORDER — OXYCODONE HYDROCHLORIDE 5 MG/1
5 TABLET ORAL
Qty: 30 TABLET | Refills: 0 | Status: SHIPPED | OUTPATIENT
Start: 2022-03-25 | End: 2022-04-01

## 2022-03-25 RX ORDER — SODIUM CHLORIDE 0.9 % (FLUSH) 0.9 %
5-40 SYRINGE (ML) INJECTION AS NEEDED
Status: DISCONTINUED | OUTPATIENT
Start: 2022-03-25 | End: 2022-03-26 | Stop reason: HOSPADM

## 2022-03-25 RX ORDER — POVIDONE-IODINE 10 %
SOLUTION, NON-ORAL TOPICAL AS NEEDED
Status: DISCONTINUED | OUTPATIENT
Start: 2022-03-25 | End: 2022-03-25 | Stop reason: HOSPADM

## 2022-03-25 RX ORDER — ACETAMINOPHEN 325 MG/1
975 TABLET ORAL ONCE
Status: COMPLETED | OUTPATIENT
Start: 2022-03-25 | End: 2022-03-25

## 2022-03-25 RX ORDER — DIPHENHYDRAMINE HYDROCHLORIDE 50 MG/ML
12.5 INJECTION, SOLUTION INTRAMUSCULAR; INTRAVENOUS
Status: ACTIVE | OUTPATIENT
Start: 2022-03-25 | End: 2022-03-26

## 2022-03-25 RX ORDER — SODIUM CHLORIDE 9 MG/ML
125 INJECTION, SOLUTION INTRAVENOUS CONTINUOUS
Status: DISCONTINUED | OUTPATIENT
Start: 2022-03-25 | End: 2022-03-26

## 2022-03-25 RX ORDER — TRAMADOL HYDROCHLORIDE 50 MG/1
50 TABLET ORAL
Qty: 28 TABLET | Refills: 0 | Status: SHIPPED | OUTPATIENT
Start: 2022-03-25 | End: 2022-04-01

## 2022-03-25 RX ORDER — PREGABALIN 75 MG/1
75 CAPSULE ORAL
Qty: 30 CAPSULE | Refills: 0 | Status: SHIPPED | OUTPATIENT
Start: 2022-03-25

## 2022-03-25 RX ORDER — FENTANYL CITRATE 50 UG/ML
50 INJECTION, SOLUTION INTRAMUSCULAR; INTRAVENOUS AS NEEDED
Status: DISCONTINUED | OUTPATIENT
Start: 2022-03-25 | End: 2022-03-25 | Stop reason: HOSPADM

## 2022-03-25 RX ORDER — EPHEDRINE SULFATE/0.9% NACL/PF 50 MG/5 ML
SYRINGE (ML) INTRAVENOUS AS NEEDED
Status: DISCONTINUED | OUTPATIENT
Start: 2022-03-25 | End: 2022-03-25 | Stop reason: HOSPADM

## 2022-03-25 RX ORDER — FENTANYL CITRATE 50 UG/ML
INJECTION, SOLUTION INTRAMUSCULAR; INTRAVENOUS AS NEEDED
Status: DISCONTINUED | OUTPATIENT
Start: 2022-03-25 | End: 2022-03-25 | Stop reason: HOSPADM

## 2022-03-25 RX ORDER — LEVOTHYROXINE SODIUM 75 UG/1
75 TABLET ORAL
Status: DISCONTINUED | OUTPATIENT
Start: 2022-03-26 | End: 2022-03-26 | Stop reason: HOSPADM

## 2022-03-25 RX ORDER — PREGABALIN 75 MG/1
75 CAPSULE ORAL
Status: DISCONTINUED | OUTPATIENT
Start: 2022-03-25 | End: 2022-03-26 | Stop reason: HOSPADM

## 2022-03-25 RX ORDER — MIDAZOLAM HYDROCHLORIDE 1 MG/ML
1 INJECTION, SOLUTION INTRAMUSCULAR; INTRAVENOUS AS NEEDED
Status: DISCONTINUED | OUTPATIENT
Start: 2022-03-25 | End: 2022-03-25 | Stop reason: HOSPADM

## 2022-03-25 RX ORDER — PROPOFOL 10 MG/ML
INJECTION, EMULSION INTRAVENOUS AS NEEDED
Status: DISCONTINUED | OUTPATIENT
Start: 2022-03-25 | End: 2022-03-25 | Stop reason: HOSPADM

## 2022-03-25 RX ORDER — MIDAZOLAM HYDROCHLORIDE 1 MG/ML
INJECTION, SOLUTION INTRAMUSCULAR; INTRAVENOUS AS NEEDED
Status: DISCONTINUED | OUTPATIENT
Start: 2022-03-25 | End: 2022-03-25 | Stop reason: HOSPADM

## 2022-03-25 RX ORDER — AMIODARONE HYDROCHLORIDE 200 MG/1
200 TABLET ORAL 2 TIMES DAILY
Status: DISCONTINUED | OUTPATIENT
Start: 2022-03-25 | End: 2022-03-26 | Stop reason: HOSPADM

## 2022-03-25 RX ORDER — PREGABALIN 75 MG/1
75 CAPSULE ORAL ONCE
Status: COMPLETED | OUTPATIENT
Start: 2022-03-25 | End: 2022-03-25

## 2022-03-25 RX ORDER — SODIUM CHLORIDE 0.9 % (FLUSH) 0.9 %
5-40 SYRINGE (ML) INJECTION EVERY 8 HOURS
Status: DISCONTINUED | OUTPATIENT
Start: 2022-03-25 | End: 2022-03-25 | Stop reason: HOSPADM

## 2022-03-25 RX ORDER — POLYETHYLENE GLYCOL 3350 17 G/17G
17 POWDER, FOR SOLUTION ORAL DAILY
Status: DISCONTINUED | OUTPATIENT
Start: 2022-03-26 | End: 2022-03-26 | Stop reason: HOSPADM

## 2022-03-25 RX ORDER — ROPIVACAINE HYDROCHLORIDE 5 MG/ML
30 INJECTION, SOLUTION EPIDURAL; INFILTRATION; PERINEURAL AS NEEDED
Status: DISCONTINUED | OUTPATIENT
Start: 2022-03-25 | End: 2022-03-25 | Stop reason: HOSPADM

## 2022-03-25 RX ORDER — THERA TABS 400 MCG
1 TAB ORAL EVERY OTHER DAY
Status: DISCONTINUED | OUTPATIENT
Start: 2022-03-25 | End: 2022-03-26 | Stop reason: HOSPADM

## 2022-03-25 RX ADMIN — BUPIVACAINE HYDROCHLORIDE 2.2 ML: 5 INJECTION, SOLUTION EPIDURAL; INTRACAUDAL; PERINEURAL at 08:39

## 2022-03-25 RX ADMIN — SODIUM CHLORIDE 500 ML: 900 INJECTION, SOLUTION INTRAVENOUS at 17:49

## 2022-03-25 RX ADMIN — MIDAZOLAM 2 MG: 1 INJECTION, SOLUTION INTRAMUSCULAR; INTRAVENOUS at 08:31

## 2022-03-25 RX ADMIN — SODIUM CHLORIDE 500 ML: 9 INJECTION, SOLUTION INTRAVENOUS at 22:32

## 2022-03-25 RX ADMIN — FENTANYL CITRATE 50 MCG: 50 INJECTION, SOLUTION INTRAMUSCULAR; INTRAVENOUS at 08:33

## 2022-03-25 RX ADMIN — ACETAMINOPHEN 650 MG: 325 TABLET ORAL at 17:38

## 2022-03-25 RX ADMIN — Medication 10 MG: at 09:50

## 2022-03-25 RX ADMIN — SODIUM CHLORIDE, POTASSIUM CHLORIDE, SODIUM LACTATE AND CALCIUM CHLORIDE: 600; 310; 30; 20 INJECTION, SOLUTION INTRAVENOUS at 10:59

## 2022-03-25 RX ADMIN — PROPOFOL 80 MCG/KG/MIN: 10 INJECTION, EMULSION INTRAVENOUS at 08:52

## 2022-03-25 RX ADMIN — Medication 10 ML: at 22:00

## 2022-03-25 RX ADMIN — PREGABALIN 75 MG: 75 CAPSULE ORAL at 22:35

## 2022-03-25 RX ADMIN — SODIUM CHLORIDE, POTASSIUM CHLORIDE, SODIUM LACTATE AND CALCIUM CHLORIDE 100 ML/HR: 600; 310; 30; 20 INJECTION, SOLUTION INTRAVENOUS at 06:24

## 2022-03-25 RX ADMIN — MIDAZOLAM 1 MG: 1 INJECTION, SOLUTION INTRAMUSCULAR; INTRAVENOUS at 08:54

## 2022-03-25 RX ADMIN — Medication 10 MG: at 09:29

## 2022-03-25 RX ADMIN — PREGABALIN 75 MG: 75 CAPSULE ORAL at 06:24

## 2022-03-25 RX ADMIN — LIDOCAINE HYDROCHLORIDE 40 MG: 20 INJECTION, SOLUTION INFILTRATION; PERINEURAL at 08:52

## 2022-03-25 RX ADMIN — ACETAMINOPHEN 975 MG: 325 TABLET ORAL at 06:24

## 2022-03-25 RX ADMIN — PROPOFOL 20 MG: 10 INJECTION, EMULSION INTRAVENOUS at 08:53

## 2022-03-25 RX ADMIN — Medication 10 MG: at 10:23

## 2022-03-25 RX ADMIN — SODIUM CHLORIDE, POTASSIUM CHLORIDE, SODIUM LACTATE AND CALCIUM CHLORIDE: 600; 310; 30; 20 INJECTION, SOLUTION INTRAVENOUS at 08:30

## 2022-03-25 RX ADMIN — OXYCODONE HYDROCHLORIDE 20 MG: 10 TABLET, FILM COATED, EXTENDED RELEASE ORAL at 06:25

## 2022-03-25 RX ADMIN — DOCUSATE SODIUM 50MG AND SENNOSIDES 8.6MG 1 TABLET: 8.6; 5 TABLET, FILM COATED ORAL at 17:39

## 2022-03-25 RX ADMIN — CEFAZOLIN SODIUM 2 G: 1 POWDER, FOR SOLUTION INTRAMUSCULAR; INTRAVENOUS at 08:47

## 2022-03-25 RX ADMIN — FENTANYL CITRATE 50 MCG: 50 INJECTION, SOLUTION INTRAMUSCULAR; INTRAVENOUS at 08:31

## 2022-03-25 RX ADMIN — CEFAZOLIN 2 G: 1 INJECTION, POWDER, FOR SOLUTION INTRAMUSCULAR; INTRAVENOUS at 17:40

## 2022-03-25 NOTE — OP NOTES
mimi    OPERATIVE REPORT     Admit Date: 3/25/2022  Admit Diagnosis: Acute left-sided low back pain without sciatica [M54.50]  Left hip pain [M25.552]  Primary osteoarthritis of left hip [M16.12]  Chronic midline low back pain without sciatica [M54.50, G89.29]  Degenerative disc disease at L5-S1 level [M51.37]  Preoperative Diagnosis: Acute left-sided low back pain without sciatica [M54.50]  Left hip pain [M25.552]  Primary osteoarthritis of left hip [M16.12]  Chronic midline low back pain without sciatica [M54.50, G89.29]  Degenerative disc disease at L5-S1 level [M51.37]  Postoperative Diagnosis: Acute left-sided low back pain without sciatica [M54.50]  Left hip pain [M25.552]  Primary osteoarthritis of left hip [M16.12]  Chronic midline low back pain without sciatica [M54.50, G89.29]  Degenerative disc disease at L5-S1 level [M51.37]    Procedure: Procedure(s):  LEFT TOTAL HIP ARTHROPLASTY, DIRECT ANTERIOR, MAKOPLASTY (REGIONAL BLOCK)  Surgeon: Carolin Cantu MD  Assistant(s): Steve Bills PA-C  Anesthesia: Regional   Estimated Blood Loss: 400cc  Specimens: * No specimens in log *   Complications: None        INDICATIONS:    The patient is a 59 y.o., male who has complained of a long history of right hip pain / groin pain. The patient has failed conservative treatment to include medical management / therapy and presents for definitive operative care. Informed consent was obtained including a discussion of the risks and benefits, which include, but are not limited to, bleeding, infection, neurovascular damage, wound complications, pain and stiffness in the hip, periprosthetic loosening, fracture, dislocation and venous thrombo-embolic disease, the patient consented for the procedure. DESCRIPTION OF PROCEDURE:       The proper side and hip were identified and signed in the preoperative holding area. All questions were answered. The patient was given Ancef preop for an antibiotic.         After adequate anesthesia, the patient was positioned supine on the [place] table, the feet were well padded in the boots. The hip was then prepped and draped in sterile fashion. The contralateral tracking array was placed. A direct anterior approach was used through skin and the tensor fascia. The interval between the Tensor Fascia and Sartorius was used and retractors were placed in an atraumatic fashion. The lateral femoral circumflex artery and vein were identified and coagulated. The proximal and distal capsule was identified and excised. A tracking array was placed in the proximal greater trochanter. The leg length and offset were verified with the MAKOplasty probe. A standard neck cut was made according to the implant geometry. The femoral head was removed. Further soft tissue was debrided and medial capsule along the neck cut released. Acetabular exposure was then obtained and the labrum was excised along with the foveal contents. Registration and acetabular mapping was performed. Once registration was complete and all soft tissues were removed the planned acetabular reamer was used in conjunction with the MAKOplasty arm. Remaining bone and osteophyte was removed, cysts were bone grafted as necessary. The final cup was then impacted in place with haptic guidance and rigid robotic arm assistance. There was one screw placed. The liner was then placed. The femur was then exposed, residual soft tissue was removed and the box osteotome was used along with blunt rounded canal finder. Sequential broaching was started with the opening broach and then the zero broach and broached up to the final implant size. The final implant was placed and a trial head was placed then the reduction was performed. Leg lengths and offset were verified. The final head was then impacted in place and thorough head and neck irrigation, the leg length was verified again. The femoral tracker was removed.        The wound was copiously irrigated with 1L of dilute betadine solution 5%. The interval between the tensor and Sartorius was closed with 0-Vicryl and running stratafix suture. The sub-Q was closed with with 2-0 Vicryl, 4-0 monocryl and dermabond. The pin sites were closed with 4-0 Monocryl. A sterile dressing was applied. The patient was awoken from anesthesia and taken to the recovery room in a stable condiition. OPERATIVE FINDINGS : Severe arthritis with large anterior osteophytes and varus neck, MDM cup used to avoid impingement Extremely sclerotic bone noted. IMPLANTS :   Implant Name Type Inv. Item Serial No.  Lot No. LRB No. Used Action   SHELL ACET CLUS H 54E TRTANIUM -- TRIDENT II - SN/A  SHELL ACET CLUS H 54E TRTANIUM -- TRIDENT II N/A [employer] [facility] [id number] Left 1 Implanted   6.5mmx  30mm  Low profile hex screw   N/A [employer] [id number] Left 1 Implanted   LINER MDM COCR 42MM E --  - SN/A  LINER MDM COCR 42MM E --  N/A [employer] [facility] [id number] Left 1 Implanted   STEM FEM SZ 4 [id number] -- ACCOLADE II V40 - SN/A  STEM FEM SZ 4 [id number] -- ACCOLADE II V40 N/A [employer] [facility] [id number] Left 1 Implanted   HEAD FEM DELT V40 +4MM NK 28MM -- V40 BIOLOX - SN/A  HEAD FEM DELT V40 +4MM NK 28MM -- V40 BIOLOX N/A [employer] [facility] [id number] Left 1 Implanted   INSERT ACET CUP X3 [id number] -- RESTORATION ADM - SN/A  INSERT ACET CUP X3 [id number] -- RESTORATION ADM N/A [employer] [facility] [id number] Left 1 Implanted       POST OPERATIVE CONSIDERATIONS :   WBAT w/ walker x 2 weeks through his first post-op then advance if there is not stem subsidence. JUSTIFICATION FOR SURGICAL ASSISTANT:   Surgical Assistant, was requried and necessary in this case, to help with soft tissue retraction, extremity positioning, equiment management, implant management, and wound closure.       Cheryl Richmond MD

## 2022-03-25 NOTE — PERIOP NOTES
PACU IN REPORT FROM ANESTHESIA    Verbal report received from   Darwin Langley   [] MD/DO-Anesthesiologist    [x] CRNA   [] with student    CHOICE ANESTHESIA:  [] GENERAL  [] TIVA  [x] MAC  [] LOCAL  [] REGIONAL  [x] SPINAL   [] EPIDURAL   **Note the anesthesia record for medications given intraoperatively. **           [] E.R.A.S. PROTOCOL    SURGICAL PROCEDURE: Procedure(s) (LRB):  LEFT TOTAL HIP ARTHROPLASTY, DIRECT ANTERIOR, MAKOPLASTY (REGIONAL BLOCK) (Left)     SURGEON: Moose Dooley MD.    Brief Initial Visual Assessment:    Patient Age: [] Infant(1-12mo)      []Pediatric(1-13yrs)    [] Adolescent(13-18yrs)    [x] Adult(18-65yrs)      []Geriatric Adult(>65yrs). Patient    [] Alert           [x]Calm & Cooperative      [] Anxious  Appearance: [x] Drowsy      [x] Sedated      [] Unresponsive     Oriented x  3            Airway:     [x] Patent                          [] Obstructs easily/Obstructed on arrival   [] \"Difficult Airway\" report by Anesthesia                        [] Airway improved with head/airway repositioning                       Airway Adjuncts Present: [] Oral Airway    [] Nasal Trumpet    [] ETT    [] LMA            Respiratory  [x] Even   [] Labored   [] Shallow   [] Tachypnea   [] Bradypnea  Pattern:    [x] Non-Labored  [] VENT and/or respiratory assistance     being provided. Skin:     [x] Pink [] Dusky    [] Pale        [x] Warm    [] Hot [] Cool       [] Cold   [x]Dry [] Moist [] Diaphoretic     Membranes:  [x] Pink [] Pale       [x] Moist [] Dry     [] Crusty     Pain:   [x] No Acute Discomfort. 0  /10 Scale [x] Verbal Numeric   [] Moderate Discomfort.     [] V.A.S. [] Acute Discomfort.                [] A.N.V.    [] Chronic-Issue Related Discomfort.   [] F.L.A.C.C. Note E-MAR for medications administered. []Faces, Giron/Baker    Note assessments documented in flowsheets; any assessment variants to be found in comments or narrative perioperative nurse notes. Post-anesthesia care now assumed by [facility] BSN, RN-BC

## 2022-03-25 NOTE — PERIOP NOTES
POST ANESTHESIA CARE    DISCHARGE / TRANSFER NOTE    TRANSFER - OUT REPORT:    [] Verbal / Bedside  [x] Phone      report  was / will-be given at 12:45 PM to   Donna  RN  receiving   Iwona Cisneros   and being transferred to    [] L&D  [] ICU  [] [facility]  [] MIU  [x] 4th Ortho/Surgical  [] 5th Medical/Surgical     for routine post - op  Following Regional for Procedure(s) (LRB):  LEFT TOTAL HIP ARTHROPLASTY, DIRECT ANTERIOR, MAKOPLASTY (REGIONAL BLOCK) (Left) by  Moose Dooley MD.    Patient Situation, Background, Assessment and Recommendations (SBAR) was provided and included information from the following SBAR report(s) (SBAR, OR Summary and MAR) which were reviewed with the receiving nurse. Lines:   Peripheral IV 03/25/22 Posterior;Right Hand (Active)   Site Assessment Clean, dry, & intact 03/25/22 1110   Phlebitis Assessment 0 03/25/22 1110   Infiltration Assessment 0 03/25/22 1110   Dressing Status Clean, dry, & intact 03/25/22 1110   Dressing Type Transparent;Tape 03/25/22 1110   Hub Color/Line Status Patent; Infusing 03/25/22 1110   Alcohol Cap Used Yes 03/25/22 0614        Also Reviewed (checked if applicable):   [] NO ADDITIONAL REVIEWS  [] PCA Drug and Settings     [x] Cold Therapy with extra gels     [] On-Q @  ml/hr   [] Drains x       [] Significant Wound care/devices (e.g. Wound vac, etc.)     [] Holding pt in PACU awaiting bed availability - additional care provided and eMAR review  [] Vent Settings      [] Critical Care Drips  Contact Precautions:  There are currently no Active Isolations  Patient transported with:  Registered Nurse  Iwona Cisneros was:    [] discharged        via   [] Wheelchair          to [] Private Vehicle     [x] transferred   [] Carried   [] Taxi / Vehicle \"for Hire\"  [] Walk out  [] Ambulance / Medical Transportation   [] Stretcher  [x] Hospital room _402_          [x] Bed      Patient was escorted by:      [x] Nurse   [] Volunteer [] Transporter / Technician  [] Parent      [] Spouse / Family /      Patient verbalized     [x] appreciation and was very pleased with care received   [] frustration with care received       throughout their stay. Patient was discharged in     [x] pleasant mood  [] sad mood  [] mad mood . Pain at discharge/transfer was      0 /10. Discharge, medication and follow-up instructions were verbalized as understood prior to discharge  (if applicable for same-day procedures being discharged.)    All personal belongings have been returned to patient, and patient/family verbally confirm receiving belongings as all present. Additional Information: Interval bedside SBAR report was completed at 1300. There were:  [x] No changes to patient condition since last assessment and/or communication with receiving RN.    [] There were changes to patient care/condition since last communication with receiving RN and were reviewed at        verbal bedside SBAR change of staff. Patient is in:   [x] No Acute Distress     [] Mild/Moderate Acute discomfort - treated and controlled  [] Acute dicomfort/distress - treated but still not fully controlled  [] Acute dicomfort/distress reported, however maximum allowable dosing has been achieved and no further        doses allowed    Vital Signs are: [x] Stable - consistent with end of PACU care. [] Unstable -  receiving continued care in appropriate setting    [x] 2 RN skin check completed with receiving RN. [x] Spouse/family/caregiver at bedside during/after staff handoff of care. [] No Spouse/family/caregiver with the patient at this time. After report, opportunity for questions and clarification was provided.     Signed: Tone CONWAY RN-BC

## 2022-03-25 NOTE — ANESTHESIA PREPROCEDURE EVALUATION
Relevant Problems   No relevant active problems       Anesthetic History   No history of anesthetic complications            Review of Systems / Medical History  Patient summary reviewed and pertinent labs reviewed    Pulmonary  Within defined limits                 Neuro/Psych   Within defined limits           Cardiovascular    Hypertension: well controlled        Dysrhythmias (last eliquis 3/21) : atrial fibrillation      Exercise tolerance: >4 METS     GI/Hepatic/Renal         Renal disease: CRI       Endo/Other      Hypothyroidism: well controlled  Arthritis     Other Findings              Physical Exam    Airway  Mallampati: I  TM Distance: 4 - 6 cm  Neck ROM: normal range of motion   Mouth opening: Normal     Cardiovascular    Rhythm: regular  Rate: normal         Dental    Dentition: Upper dentition intact and Lower dentition intact     Pulmonary  Breath sounds clear to auscultation               Abdominal         Other Findings            Anesthetic Plan    ASA: 3  Anesthesia type: spinal          Induction: Intravenous  Anesthetic plan and risks discussed with: Patient

## 2022-03-25 NOTE — PROGRESS NOTES
TOTAL HIP ARTHROPLASTY DAILY NOTE     ASSESSMENT / PLAN :   1. Pain Control : Excellent - Able to sleep and participate with therapy  2. Overnight Issues : Seen on evening rounds, hypotension with mobilization. 3. Wound or incisional issue : Healing incision with no visible drainage  4. Therapy / Weight Bearing Recommendations : Weight bear as tolerated with use of a walker and two person assist while mobilizing  5. DVT Prophylaxis : Mechanical and Aspirin and mechanical lower extremity compression device  6. Disposition : Discharge to home with home health (maximum of 4 visits)  7. Medical Concerns : Stable  8. Comments : home tomorrow if cleared by therapy. POD  Day of Surgery s/p Procedure(s):  LEFT TOTAL HIP ARTHROPLASTY, DIRECT ANTERIOR, MAKOPLASTY (REGIONAL BLOCK)     SUBJECTIVE :     Concerns : none. OBJECTIVE :     Vitals:    03/25/22 1522 03/25/22 1753 03/25/22 1807 03/25/22 1821   BP: 112/71 121/78 116/77 112/72   Pulse: 60 (!) 54 62 (!) 59   Resp: 16      Temp: 97.5 °F (36.4 °C)      SpO2: 92%      Weight:       Height:           Alert and oriented x3. left exam of the hip reveals that the dressing is clean, dry and intact. The patient is able to fire the quadriceps / flex at the hip  Sensation is intact to light touch. No calf pain. ANTICOAGULANTS / LABS :       Key Anti-Platelet Anticoagulant Meds     The patient is on no antiplatelet meds or anticoagulants. Labs:  No results for input(s): HGB, HCT, INR, NA, K, CL, CO2, BUN, CREA, GLU, HGBEXT, HCTEXT, INREXT in the last 72 hours.      Patient mobility           Merline Salvo, MD  Cell [phone]  Analilia Ceballos, [facility]  Cell [phone]  Medical Assistants: Bucky Elise [phone]                 Surgery Scheduler: LUAN Alicea [phone] ext [phone]

## 2022-03-25 NOTE — ANESTHESIA PROCEDURE NOTES
Spinal Block    Start time: 3/25/2022 8:31 AM  End time: 3/25/2022 8:39 AM  Performed by: Romero Roberts CRNA  Authorized by: Marija Valero MD     Pre-procedure:   Indications: at surgeon's request and primary anesthetic  Preanesthetic Checklist: patient identified, risks and benefits discussed, anesthesia consent, site marked, patient being monitored and timeout performed    Timeout Time: 08:31 EDT          Spinal Block:   Patient Position:  Seated  Prep Region:  Lumbar  Prep: chlorhexidine      Location:  L3-4  Technique:  Single shot        Needle:   Needle Type:  Pencan  Needle Gauge:  25 G  Attempts:  1      Events: CSF confirmed, no blood with aspiration and no paresthesia        Assessment:  Insertion:  Uncomplicated  Patient tolerance:  Patient tolerated the procedure well with no immediate complications

## 2022-03-25 NOTE — ANESTHESIA POSTPROCEDURE EVALUATION
Procedure(s):  LEFT TOTAL HIP ARTHROPLASTY, DIRECT ANTERIOR, MAKOPLASTY (REGIONAL BLOCK). spinal    Anesthesia Post Evaluation      Multimodal analgesia: multimodal analgesia used between 6 hours prior to anesthesia start to PACU discharge  Patient location during evaluation: bedside  Patient participation: complete - patient participated  Level of consciousness: awake  Pain management: adequate  Airway patency: patent  Anesthetic complications: no  Cardiovascular status: acceptable  Respiratory status: acceptable  Hydration status: acceptable        INITIAL Post-op Vital signs:   Vitals Value Taken Time   /70 03/25/22 1245   Temp 36.4 °C (97.5 °F) 03/25/22 1210   Pulse 59 03/25/22 1246   Resp 11 03/25/22 1246   SpO2 90 % 03/25/22 1246   Vitals shown include unvalidated device data.

## 2022-03-25 NOTE — PROGRESS NOTES
3/25/2022     5:04 PM  CM received acceptance from [facility] for RW. RW delivered, and invoice attached in AllScripts. 4:43 PM  Care Management Assessment      Reason for Admission: Elective - Surgery required      ICD-10-CM ICD-9-CM    1. Arthritis of left hip  M16.12 716.95 pregabalin (Lyrica) 75 mg capsule      oxyCODONE IR (ROXICODONE) 5 mg immediate release tablet      traMADoL (ULTRAM) 50 mg tablet       Assessment:   []In person with pt   []Via p/c with pt   [x]With family member in person. Who/Relation: Gio Magana - wife    []With family member via p/c. Who/Relation:   []Chart Review    RUR: NA - OBS  Risk Level: [x]Low []Moderate []High  Value-based purchasing: [] Yes [x] No  Bundle patient: [] Yes [x] No   Specify:     Advance Directive: No Order. [x] No AD on file. [] AD on file. [] Current AD not on file. Copy requested. [] Requests AD, and referral submitted to [facility]. Healthcare Decision Maker:  Gio Magana - wife        Assessment:    Age: 59    Sex: [x] Male []Female     Residency: [x]Private residence []Apartment []Assisted Living []LTC []Other:   Exterior Steps: 4  Interior Steps: 1 flight to bedroom    Lives With: [x]With spouse []Other family members []Underage children []Alone []Care provider []Other:    Prior functioning:  [x]Independent with ADLs and iADLS []Dependent with ADLs and iADLs []Partial dependence, Specify:     Prior DME required:  [x]None []RW []Cane []Crutches []Bedside commode []CPAP []Home O2 (Liter/Provider: ) []Nebulizer   []Shower Chair []Wheelchair []Hospital Bed []Carla []Stair lift []Rollator []Other:    DME available: []None []RW []Cane []Crutches []Bedside commode []CPAP []Home O2 (Liter/Provider: ) []Nebulizer   []Shower Chair []Wheelchair []Hospital Bed []Carla []Stair lift []Rollator [x]Other: CM consult for RW noted. CM completed referral via [employer] to [facility], and is awaiting response.      Rehab history: []None [x]Outpatient PT []Home Health (Provider/Date: ) []SNF (Provider/Date: ) []IPR (Provider/Date: ) []LTC (Provider/Date: ) []Hospice (Provider/Date: )  []Other:     Discharge Concerns: []Yes [x]No []Unknown   Describe:    Comments: Insurer:   [address] [place] [address] [address] [facility] HMO/CHOICE PLUS/POS Phone: [phone]    Subscriber: Edu Teixeiradinora Subscriber#: [id number]    Group#: [id number] Precert#: --          PCP: Supriya Marrufo   Address: [facility] [address] [facility]   Phone number: [phone]   Current patient: [x]Yes []No   Approximate date of last visit: within 6 months   Access to virtual PCP visits: []Yes [x]No    Pharmacy:  [address] Transport: Family       Transition of care plan:    []Unable to determine at this time. Awaiting clinical progress, and disposition recommendations. [] Home with outpatient follow-up    [x] Home with Outpatient PT and outpatient follow-up   Pt aware of OP appt? [x]Yes, Provider: [facility]    []Not scheduled   Transport provider: Family    [] Home with family assistance as needed and outpatient follow-up   Family able to assist:    Schedule:  Transport provider:      [] Home with Home Health   - Provider:     []SNF/IPR   -[]Preferences given:   []Listing provided and preferences requested   -Status: []Pending []Accepted:    -Auth required: []Yes []No    -Auth initiated date:   -3 midnight stay required: []Yes []No  Date satisfied:     [] Home with Hospice   -Provider:     [] [facility] information provided. [] Other:      Ramos Stevenson MA    Care Management Interventions  PCP Verified by CM: Yes Heidy Benavidez)  Mode of Transport at Discharge:  Other (see comment)  Transition of Care Consult (CM Consult): Discharge Planning  MyChart Signup: No  Discharge Durable Medical Equipment: Yes  Physical Therapy Consult: Yes  Occupational Therapy Consult: Yes  Speech Therapy Consult: No  Support Systems: Spouse/Significant Other  Confirm Follow Up Transport: Family  Discharge Location  Patient Expects to be Discharged to[de-identified] Home with outpatient services

## 2022-03-25 NOTE — PERIOP NOTES
Family / caregiver update provided via phone as spouse left [facility], questions answered. Location for room TBA when available - will call and provide another update when that is provided. 12:19 PM  4th floor Charge RN ( Katherin Davidson RN)  notified of admission/pending return and awaiting receiving ( tba  RN) call-back for assignment to/return to room 402    Family / caregiver update provided via phone, questions answered. Location for room 402  provided  after PACU stay complete, directions provided for room location.

## 2022-03-25 NOTE — DISCHARGE INSTRUCTIONS
TOTAL HIP DISCHARGE INSTRUCTIONS    Patient: Lennox Lyons MRN: [id number]  SSN: [id number]              Please take the time to review the following instructions before you leave the hospital and use them as guidelines during your recovery from surgery. If you have any questions you may contact my office at [phone]  After business hours or during the weekend you can contact me through [address] or text / call at [phone] (cell phone) for emergency's. Please use the office number during regular business hours. SPECIAL INSTRUCTIONS :   1. Do not bend greater than 90 degrees at the hip for 4 weeks following your discharge  2. Avoid exercises or activities which bring the leg out or away from the mid-line of the body. The surgical repair involves this muscle and it will require 4 weeks to heal. You may disregard these instructions for a direct anterior approach. 3. You may walk as tolerated and are encouraged to work daily on progressing your activities with a walker initially. 4. You may transition to a cane for walking 5-7 days from surgery once you feel safe. You may use a walker for longer periods if you feel unstable. 5. Call my office for routine questions M-F at [phone]. You may contact me directly through [address] [place] [address] if there are specific questions or text / call using my cell number [phone]. DRESSING :     Post-op Dressings : This should be removed by physical therapy or you may remove this yourself 7 days after the date of your surgery. If there is no drainage, then a simple dressing may be used or no dressing at all. Other dressing options can be purchased over the counter at a local pharmacy or medical supply vendor. A porous adhesive dressing such as pictured above can be purchased at a local [employer] or "[employer]. You only need to keep the incision covered for 7 days after showers.  A dressing may be used for longer if there are issues with clothing clinging to the incision. Showering/ Bathing: You may shower with the Post-op dressing in place. This is left in place for 7 days following discharge from the hospital. If your incision is dry without drainage you may shower following your discharge home. After 7 days your dressing should be removed for showering. It is fine to have water run over the incision. Do not vigorously scrub your incision. Apply a clean, dry dressing after you have dried your incision. Do not take a bath or get into a swimming pool / [facility] until you follow up with Dr. Esther Hilliard. Do not soak your incision under water. If there is continued drainage or you are concerned contact Dr [facility] office prior to showering [phone] ext [phone] . Diet:  You may advance to your regular diet as tolerated. Increase your clear liquid intake for the next 2-3 days. Medication:      You will be given prescriptions for pain medication when you are discharged from the hospital. The side effects of these medications can be substantial and the narcotic medications are not mandatory. You may substitute these medications with Tylenol or Alleve / Motrin. Please use the medications as prescribed. Pain medications may cause constipation- Colace twice daily and Miralax one scoop daily while taking the narcotic medication should help prevent constipation. Please discuss with your local pharmacist regarding increasing this dosage if constipation persists. Other possible side effects of pain medication are dizziness, headache, nausea, vomiting, and urinary retention. Discontinue the pain medication if you develop itching, rash, shortness of breath, or difficulties swallowing. If these symptoms become severe or are not relieved by discontinuing the medication, you should seek immediate medical attention. Refills of pain medication are authorized during office hours only (8 AM- 5 PM  Monday thru Friday).  Many of these medication will require you or a family member to pick-up a physical prescription at the office. Medications other than antiinflammatories will not be called into the pharmacy after business hours. You may resume the medication(s) you were taking prior to your surgery. Narcotics may change the effects of some antidepressant medication(s). If you have any questions about possible interactions between your regular medications and the pain medication, you should ask the pharmacist or contact the prescribing physician. If you have constipation which is not improved by oral stool softeners then a Ducolax suppository should be purchased over the counter. Continue the blood thinner (Aspirin or Lovenox) for a total of 30 days following surgery. Follow up appointment:    Please call our office at [phone] for your follow up appointment. This should be scheduled 14 days following the date of surgery. Physical Therapy / Nursing:    Physical Therapy following surgery will be arranged as an outpatient or at home. They have specific instructions for rehab and wound care. It is fine to have physical therapy remove your dressing at 7 days following surgery. Returning to work:    Normal return to work is 6-12 weeks following surgery. Depending on your progression following surgery and specific job duties you may take longer for a full return to work. DRIVING    You should not return to driving until you are off all opioid pain medications and able to safely and quickly apply the brakes. This is normally 2-6 weeks for left sided surgery and 2-8 weeks for right sided surgery. Important Signs and Symptoms:    If any of the following signs or symptoms occur, you should contact Dr. Garces Comment office.   Please be advised if a problem arises which you feel requires immediate medical attention or you are unable to contact Dr. Garces Comment office you should seek immediate medical attention at the ER or other health care facility you have access to. A sudden increase in swelling and/or redness or warmth at the area your surgery was performed which isnt relieved by rest, ice, and elevation. Oral temperature greater than 101 degrees for 12 hours or more which isnt relieved by an increase in fluid intake and taking 2 Tylenol every 4-6 hours. Excessive drainage from your incisions, or drainage which hasnt stopped by 72 hours after your surgery. Fever, chills, shortness of breath, chest pain, nausea, vomiting or other signs and symptoms which are of concern to you. YOUR TOTAL JOINT REPLACEMENT  FREQUENTLY ASKED QUESTIONS  What should I take for pain? You will be discharged with four medications for pain (Oxycodone, Tramadol, Ibuprofen and Tylenol). These may vary slightly depending on what you were taking in the hospital.   1st Line - Tylenol Arthritis Strength - 325-650 mg every 4 hours (scheduled for the 1st 24 hours)  2nd Line -  Ibuprofen 400 (2 tabs) - 800 (4 tabs) mg every 8 hours  (scheduled for the 1st 24 hours)  3rd Line - Oxycodone 5 mg (1-2 tablets every 4-6 hrs)  4th Line - Tramadol 50 mg (1-2 tablets every 4-6 hours) - take these between Oxycodone doses if your pain is not alleviated. When should I call for advice regarding my pain? If your pain is still uncontrolled after being on the regimen above for at least 12 hours, please call the office [phone] or text / call my cell after hours [phone]. Can I get refills? Opioid refills are provided for the first 2-6 weeks following surgery. Use Tylenol 500 mg along with Aleve 220mg twice daily or Motrin 200-800mg every 4-6 hours during the daytime hours after two weeks. After two weeks, I suggest the opioid pain medications be used only 1 hour prior to your physical therapy appointment and 1 hour before sleeping at night. Use Tylenol and Ibuprofen at other times during the day.    Keep in mind that you will need to discontinue opioids before you resume driving. Is swelling normal?  Almost everyone has some degree of swelling following surgery. Following hip and knee replacement surgery, swelling can be normal below the incision for the first few weeks. This swelling peaks around 5-7 days after surgery. It is not unusual to have some bruising about the back of the thigh, calf, ankle, and foot. What should I do for the swelling? Keep the limb elevated above the level of your heart - 'Toes above Nose'. Apply compression socks (knee high for total knees and up to the mid-thigh for total hips). Use the ice packs that you are discharged home with several times a day for the first several weeks. How long should I remain on blood thinners following surgery? 30 days  Which blood thinners will I be on? Can I take them with Tylenol? Aspirin 81 mg twice daily - these should be taken with meals and can be used with Tylenol. In certain instances, you may be sent home on Lovenox for 30 days. For short periods of time (30 days), aspirin and anti-inflammatories (i.e. Aleve, Motrin / Advil / ibuprofen, diclofenac, etc. can be taken together). When can I drive? Once you have stopped using regular narcotic pain medications (Oxycodone, Percocet, Lortab, Norco etc.) and can safely apply the brakes without hesitation, (emergency braking). When can I shower? You may shower immediately if your Optifoam bandage is dry and without discharge. The Optifoam dressing should be removed 7 days following surgery, after which you may continue to shower. No submersion of the incision, bathing or swimming for 14 days following surgery or until cleared by Dr [place]. Can I remove this dressing? Yes, this is removed just like removing a band aid. If you are concerned, this can be removed by your therapist.   What do I do with the dressing when I shower? The Optifoam dressing is waterproof and you may shower with it.    The incision is sealed with Dermabond, a biologic skin glue, which also serves as a watertight seal. If your incision is draining, it is no longer considered to be watertight - you should contact our office prior to showering if you experience any drainage. Which dressing should I purchase after I remove my Optifoam?  An occlusive dressing which covers your entire incision. This does not have to be waterproof, but will need to be removed when you shower and then replaced. (Example Only)  How active should I be following surgery? Progress activities in moderation and at your own pace. Walking room to room in your house is encouraged. Walk each day and set progressive goals with small increments (1st week -1/2 block of walking, 2nd week - 1 block, 3rd week - 2 blocks, etc.)  Will I need help at home? You will likely need a caretaker who should be available for the first week following surgery. It is fine for family members to work during the day, as long as they are available by phone. Planning ahead makes coming home from the hospital a much easier transition. How long will my surgery take? On average, total joint replacement takes approximately 1-2 hour. The entire process, including pre-op and post-op care can last as long as 4- 5 hours before you are transferred to your room. stroke, pulmonary embolism (a clot going from the legs to the lungs), and even death with surgery. Will I be given antibiotics? Will I need antibiotics at discharge? Antibiotics will be given to you both before and after your procedure. To further minimize the risk of infection, we have streamlined the surgical procedure to take less time in the operating room. You do not require antibiotics following surgery.       Please do not hesitate to contact me through [employer] or by text / call me at [phone] (cell phone) for questions following surgery - MD Zachary Dinh MD  Cell [phone]  Angeli Partida PA-C  Cell [phone] Staff : Dashawn Osorio @ [phone]

## 2022-03-25 NOTE — PROGRESS NOTES
Problem: Mobility Impaired (Adult and Pediatric)  Goal: *Acute Goals and Plan of Care (Insert Text)  Note: FUNCTIONAL STATUS PRIOR TO ADMISSION: Patient was independent and active without use of DME.    HOME SUPPORT PRIOR TO ADMISSION: The patient lived with wife but did not require assist.    Physical Therapy Goals  Initiated 3/25/2022    1. Patient will move from supine to sit and sit to supine , scoot up and down, and roll side to side in bed with modified independence within 4 days. 2. Patient will perform sit to stand with modified independence within 4 days. 3. Patient will ambulate with modified independence for 150 feet with the least restrictive device within 4 days. 4. Patient will ascend/descend 4 stairs with 1 handrail and SPC with modified independence within 4 days. 5. Patient will verbalize and demonstrate understanding of anterior hip precautions per protocol within 4 days. 6. Patient will perform hip home exercise program per protocol with independence within 4 days. PHYSICAL THERAPY EVALUATION  Patient: Rebecca Millan (84 y.o. male)  Date: 3/25/2022  Primary Diagnosis: Acute left-sided low back pain without sciatica [M54.50]  Left hip pain [M25.552]  Primary osteoarthritis of left hip [M16.12]  Chronic midline low back pain without sciatica [M54.50, G89.29]  Degenerative disc disease at L5-S1 level [M51.37]  Procedure(s) (LRB):  LEFT TOTAL HIP ARTHROPLASTY, DIRECT ANTERIOR, MAKOPLASTY (REGIONAL BLOCK) (Left) Day of Surgery   Precautions:   Fall    ASSESSMENT  Based on the objective data described below, the patient presents with a drastic drop in BP as he sat eob on POD #0. He did not have the opportunity to stand, ambulate, or attempt stair training as BP dropped to 68/45 and patient with symptomatic diaphoretic, dizziness and becoming incoherent. He did not lose consciousness and was quickly placed in trendelenburg.  Patient's post op pain had not yet begun and overall patient's strength and endurance intact. 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 Likely to do well with PT assessment including stairs tomorrow am and be able to discharge to home. Will have outpatient follow up    Current Level of Function Impacting Discharge (mobility/balance): unable to stand, BP drop to 68/45 and symptomatic so return to supine    Functional Outcome Measure: The patient scored 40/100 on the Barthel Index outcome measure . Other factors to consider for discharge: low BP today , pod #0     Patient will benefit from skilled therapy intervention to address the above noted impairments. PLAN :  Recommendations and Planned Interventions: bed mobility training, transfer training, gait training, therapeutic exercises, patient and family training/education, and therapeutic activities      Frequency/Duration: Patient will be followed by physical therapy:  twice daily to address goals. Recommendation for discharge: (in order for the patient to meet his/her long term goals)  Outpatient physical therapy follow up recommended for strengthening, ROM, balance , and gait     This discharge recommendation:  Has not yet been discussed the attending provider and/or case management    IF patient discharges home will need the following DME: rolling walker- received this pm         SUBJECTIVE:   Patient stated i am really dizzy.     OBJECTIVE DATA SUMMARY:   HISTORY:    Past Medical History:   Diagnosis Date    A-fib ([facility]     Anticoagulated     Xarelto- followed by Dr. Trung Lino COVID-19 01/24/2022    COVID-19 vaccine series completed     Plus booster    HTN (hypertension)     Hypothyroidism     Seizure ([facility] 2010    None since- No known cause     Past Surgical History:   Procedure Laterality Date    HX HERNIA REPAIR      HX VASECTOMY         Personal factors and/or comorbidities impacting plan of care: afib, anticoagulated    Home Situation  Home Environment: Private residence  # Steps to Enter: 4  Rails to Enter: Yes  Hand Rails : Left  One/Two Story Residence: Two story, live on 1st floor  # of Interior Steps: 15  Interior Rails: Both  Lift Chair Available: No  Living Alone: No  Support Systems: Spouse/Significant Other  Patient Expects to be Discharged to[de-identified] Home with outpatient services  Current DME Used/Available at Home: Cane, straight,Walker, rolling  Tub or Shower Type: Shower    EXAMINATION/PRESENTATION/DECISION MAKING:   Critical Behavior:  Neurologic State: Alert  Orientation Level: Oriented X4  Cognition: Appropriate decision making,Appropriate for age attention/concentration,Appropriate safety awareness,Follows commands  Safety/Judgement: Awareness of environment,Good awareness of safety precautions,Insight into deficits  Hearing: Auditory  Auditory Impairment: None  Skin:  anterior hip incision with bandage intact  Edema: anterior incision, hip  Range Of Motion:  AROM: Within functional limits           PROM: Within functional limits           Strength:    Strength: Within functional limits                    Tone & Sensation:   Tone: Normal              Sensation: Intact               Coordination:  Coordination: Generally decreased, functional    Functional Mobility:  Bed Mobility:  Rolling: Minimum assistance  Supine to Sit: Contact guard assistance  Sit to Supine: Minimum assistance; Moderate assistance  Scooting: Stand-by assistance  Transfers:  Sit to Stand:  (NT BP too low)    Balance: NT     Ambulation/Gait Training:    Gait Description (WDL):  (NT BP too low)      Therapeutic Exercises:   Initiated and completed 5 reps of each of supine exercises on Anterior Hip Exercise Handout: ankle pumps, Quad Heel and Glut Sets, Heel slides, Hip Abduction, and Hip IR.     Functional Measure:  Barthel Index:    Bathin  Bladder: 10  Bowels: 10  Groomin  Dressin  Feeding: 10  Mobility: 0  Stairs: 0  Toilet Use: 0  Transfer (Bed to Chair and Back): 0  Total: 40/100       The Barthel ADL Index: Guidelines  1. The index should be used as a record of what a patient does, not as a record of what a patient could do. 2. The main aim is to establish degree of independence from any help, physical or verbal, however minor and for whatever reason. 3. The need for supervision renders the patient not independent. 4. A patient's performance should be established using the best available evidence. Asking the patient, friends/relatives and nurses are the usual sources, but direct observation and common sense are also important. However direct testing is not needed. 5. Usually the patient's performance over the preceding 24-48 hours is important, but occasionally longer periods will be relevant. 6. Middle categories imply that the patient supplies over 50 per cent of the effort. 7. Use of aids to be independent is allowed. Score Interpretation (from [address] [address])    Independent   60-79 Minimally independent   40-59 Partially dependent   20-39 Very dependent   <20 Totally dependent     -Thais Soto., Barthel, D.W. (1965). Functional evaluation: the Barthel Index. [address] [facility] ([address] (1997). The Barthel activities of daily living index: self-reporting versus actual performance in the old (> or = 75 years). Journal of [address] [place] [address] 45(7), [facility], J.J.TITA.F, Benjamín Condon., [facility]. (1999). Measuring the change in disability after inpatient rehabilitation; comparison of the responsiveness of the Barthel Index and Functional Fleming Measure. Journal of Neurology, Neurosurgery, and Psychiatry, 66(4), 143-227. Sarah Mclean, N.J.A, LAURYN Loya, & Oanh Santizo MCOURTNEY. (2004) Assessment of post-stroke quality of life in cost-effectiveness studies: The usefulness of the Barthel Index and the EuroQoL-5D.  Quality of Life Research, 15, 933-16            Physical Therapy Evaluation Charge Determination   History Examination Presentation Decision-Making   LOW Complexity : Zero comorbidities / personal factors that will impact the outcome / POC LOW Complexity : 1-2 Standardized tests and measures addressing body structure, function, activity limitation and / or participation in recreation  LOW Complexity : Stable, uncomplicated  Other outcome measures Barthel Index  LOW       Based on the above components, the patient evaluation is determined to be of the following complexity level: LOW     Pain Ratin/10    Activity Tolerance:   Poor and signs and symptoms of orthostatic hypotension    After treatment patient left in no apparent distress:   Supine in bed, Call bell within reach, Bed / chair alarm activated, Caregiver / family present, and Side rails x 3    COMMUNICATION/EDUCATION:   The patients plan of care was discussed with: Registered nurse. Fall prevention education was provided and the patient/caregiver indicated understanding. and Patient/family have participated as able in goal setting and plan of care.     Thank you for this referral.  Jodie Schulz, PT, DPT   Time Calculation: 45 mins

## 2022-03-25 NOTE — PROGRESS NOTES
Called into room by PT. PT stated after pt being on edge up for a few minutes, BP dropped to 68/43. Pt diaphoretic and \"out of it\" at this time but did not loose consciousness. PT laid pt back in bed in trendelenburg, BP back to 111/62. HOB elevated again and BP remains stable at 119/80. PRN fluid bolus started.  OK to hold amlodipine for tonight per Ry Rios, NP

## 2022-03-26 VITALS
SYSTOLIC BLOOD PRESSURE: 124 MMHG | WEIGHT: 197.53 LBS | HEART RATE: 73 BPM | TEMPERATURE: 99 F | BODY MASS INDEX: 27.65 KG/M2 | DIASTOLIC BLOOD PRESSURE: 70 MMHG | HEIGHT: 71 IN | RESPIRATION RATE: 17 BRPM | OXYGEN SATURATION: 96 %

## 2022-03-26 LAB
ALBUMIN SERPL-MCNC: 3.1 G/DL (ref 3.5–5)
ALBUMIN/GLOB SERPL: 1.1 {RATIO} (ref 1.1–2.2)
ALP SERPL-CCNC: 48 U/L (ref 45–117)
ALT SERPL-CCNC: 30 U/L (ref 12–78)
ANION GAP SERPL CALC-SCNC: 3 MMOL/L (ref 5–15)
AST SERPL-CCNC: 21 U/L (ref 15–37)
BASOPHILS # BLD: 0 K/UL (ref 0–0.1)
BASOPHILS NFR BLD: 0 % (ref 0–1)
BILIRUB DIRECT SERPL-MCNC: 0.1 MG/DL (ref 0–0.2)
BILIRUB SERPL-MCNC: 0.3 MG/DL (ref 0.2–1)
BUN SERPL-MCNC: 18 MG/DL (ref 6–20)
BUN/CREAT SERPL: 15 (ref 12–20)
CALCIUM SERPL-MCNC: 7.6 MG/DL (ref 8.5–10.1)
CHLORIDE SERPL-SCNC: 106 MMOL/L (ref 97–108)
CO2 SERPL-SCNC: 30 MMOL/L (ref 21–32)
CREAT SERPL-MCNC: 1.23 MG/DL (ref 0.7–1.3)
DIFFERENTIAL METHOD BLD: ABNORMAL
EOSINOPHIL # BLD: 0 K/UL (ref 0–0.4)
EOSINOPHIL NFR BLD: 0 % (ref 0–7)
ERYTHROCYTE [DISTWIDTH] IN BLOOD BY AUTOMATED COUNT: 13.2 % (ref 11.5–14.5)
ERYTHROCYTE [DISTWIDTH] IN BLOOD BY AUTOMATED COUNT: 13.2 % (ref 11.5–14.5)
GLOBULIN SER CALC-MCNC: 2.8 G/DL (ref 2–4)
GLUCOSE SERPL-MCNC: 119 MG/DL (ref 65–100)
HCT VFR BLD AUTO: 33.2 % (ref 36.6–50.3)
HCT VFR BLD AUTO: 34.7 % (ref 36.6–50.3)
HGB BLD-MCNC: 10.7 G/DL (ref 12.1–17)
HGB BLD-MCNC: 11.1 G/DL (ref 12.1–17)
IMM GRANULOCYTES # BLD AUTO: 0.1 K/UL (ref 0–0.04)
IMM GRANULOCYTES NFR BLD AUTO: 1 % (ref 0–0.5)
LYMPHOCYTES # BLD: 0.6 K/UL (ref 0.8–3.5)
LYMPHOCYTES NFR BLD: 7 % (ref 12–49)
MCH RBC QN AUTO: 30.9 PG (ref 26–34)
MCH RBC QN AUTO: 31.3 PG (ref 26–34)
MCHC RBC AUTO-ENTMCNC: 32 G/DL (ref 30–36.5)
MCHC RBC AUTO-ENTMCNC: 32.2 G/DL (ref 30–36.5)
MCV RBC AUTO: 96 FL (ref 80–99)
MCV RBC AUTO: 97.7 FL (ref 80–99)
MONOCYTES # BLD: 0.7 K/UL (ref 0–1)
MONOCYTES NFR BLD: 8 % (ref 5–13)
NEUTS SEG # BLD: 7.7 K/UL (ref 1.8–8)
NEUTS SEG NFR BLD: 84 % (ref 32–75)
NRBC # BLD: 0 K/UL (ref 0–0.01)
NRBC # BLD: 0 K/UL (ref 0–0.01)
NRBC BLD-RTO: 0 PER 100 WBC
NRBC BLD-RTO: 0 PER 100 WBC
PLATELET # BLD AUTO: 158 K/UL (ref 150–400)
PLATELET # BLD AUTO: 168 K/UL (ref 150–400)
PMV BLD AUTO: 10.1 FL (ref 8.9–12.9)
PMV BLD AUTO: 10.2 FL (ref 8.9–12.9)
POTASSIUM SERPL-SCNC: 4.3 MMOL/L (ref 3.5–5.1)
PROT SERPL-MCNC: 5.9 G/DL (ref 6.4–8.2)
RBC # BLD AUTO: 3.46 M/UL (ref 4.1–5.7)
RBC # BLD AUTO: 3.55 M/UL (ref 4.1–5.7)
RBC MORPH BLD: ABNORMAL
SODIUM SERPL-SCNC: 139 MMOL/L (ref 136–145)
TSH SERPL DL<=0.05 MIU/L-ACNC: 4.39 UIU/ML (ref 0.36–3.74)
WBC # BLD AUTO: 7.9 K/UL (ref 4.1–11.1)
WBC # BLD AUTO: 9.1 K/UL (ref 4.1–11.1)

## 2022-03-26 PROCEDURE — 84443 ASSAY THYROID STIM HORMONE: CPT

## 2022-03-26 PROCEDURE — 74011250636 HC RX REV CODE- 250/636

## 2022-03-26 PROCEDURE — 97535 SELF CARE MNGMENT TRAINING: CPT

## 2022-03-26 PROCEDURE — 93005 ELECTROCARDIOGRAM TRACING: CPT

## 2022-03-26 PROCEDURE — 36415 COLL VENOUS BLD VENIPUNCTURE: CPT

## 2022-03-26 PROCEDURE — 97116 GAIT TRAINING THERAPY: CPT

## 2022-03-26 PROCEDURE — 85025 COMPLETE CBC W/AUTO DIFF WBC: CPT

## 2022-03-26 PROCEDURE — 80076 HEPATIC FUNCTION PANEL: CPT

## 2022-03-26 PROCEDURE — 74011250636 HC RX REV CODE- 250/636: Performed by: PHYSICIAN ASSISTANT

## 2022-03-26 PROCEDURE — 74011250637 HC RX REV CODE- 250/637: Performed by: PHYSICIAN ASSISTANT

## 2022-03-26 PROCEDURE — 99222 1ST HOSP IP/OBS MODERATE 55: CPT | Performed by: FAMILY MEDICINE

## 2022-03-26 PROCEDURE — G0378 HOSPITAL OBSERVATION PER HR: HCPCS

## 2022-03-26 PROCEDURE — 80048 BASIC METABOLIC PNL TOTAL CA: CPT

## 2022-03-26 PROCEDURE — 74011250637 HC RX REV CODE- 250/637: Performed by: ORTHOPAEDIC SURGERY

## 2022-03-26 PROCEDURE — 97165 OT EVAL LOW COMPLEX 30 MIN: CPT

## 2022-03-26 PROCEDURE — 74011250636 HC RX REV CODE- 250/636: Performed by: STUDENT IN AN ORGANIZED HEALTH CARE EDUCATION/TRAINING PROGRAM

## 2022-03-26 PROCEDURE — 74011000250 HC RX REV CODE- 250: Performed by: PHYSICIAN ASSISTANT

## 2022-03-26 PROCEDURE — 85027 COMPLETE CBC AUTOMATED: CPT

## 2022-03-26 RX ORDER — SODIUM CHLORIDE 9 MG/ML
125 INJECTION, SOLUTION INTRAVENOUS CONTINUOUS
Status: DISCONTINUED | OUTPATIENT
Start: 2022-03-26 | End: 2022-03-26 | Stop reason: HOSPADM

## 2022-03-26 RX ORDER — ACETAMINOPHEN 325 MG/1
650 TABLET ORAL
Status: DISCONTINUED | OUTPATIENT
Start: 2022-03-26 | End: 2022-03-26 | Stop reason: HOSPADM

## 2022-03-26 RX ADMIN — SODIUM CHLORIDE 500 ML: 9 INJECTION, SOLUTION INTRAVENOUS at 05:18

## 2022-03-26 RX ADMIN — SODIUM CHLORIDE 125 ML/HR: 9 INJECTION, SOLUTION INTRAVENOUS at 04:23

## 2022-03-26 RX ADMIN — DOCUSATE SODIUM 50MG AND SENNOSIDES 8.6MG 1 TABLET: 8.6; 5 TABLET, FILM COATED ORAL at 08:04

## 2022-03-26 RX ADMIN — LEVOTHYROXINE SODIUM 75 MCG: 0.07 TABLET ORAL at 06:33

## 2022-03-26 RX ADMIN — CEFAZOLIN 2 G: 1 INJECTION, POWDER, FOR SOLUTION INTRAMUSCULAR; INTRAVENOUS at 00:12

## 2022-03-26 RX ADMIN — RIVAROXABAN 20 MG: 20 TABLET, FILM COATED ORAL at 08:06

## 2022-03-26 RX ADMIN — Medication 10 ML: at 06:00

## 2022-03-26 RX ADMIN — ACETAMINOPHEN 650 MG: 325 TABLET ORAL at 00:12

## 2022-03-26 RX ADMIN — SODIUM CHLORIDE 125 ML/HR: 9 INJECTION, SOLUTION INTRAVENOUS at 01:00

## 2022-03-26 RX ADMIN — ACETAMINOPHEN 650 MG: 325 TABLET ORAL at 12:37

## 2022-03-26 RX ADMIN — ACETAMINOPHEN 650 MG: 325 TABLET ORAL at 05:17

## 2022-03-26 RX ADMIN — Medication 10 ML: at 12:39

## 2022-03-26 RX ADMIN — POLYETHYLENE GLYCOL 3350 17 G: 17 POWDER, FOR SOLUTION ORAL at 08:09

## 2022-03-26 RX ADMIN — ACETAMINOPHEN 650 MG: 325 TABLET ORAL at 08:05

## 2022-03-26 RX ADMIN — FAMOTIDINE 20 MG: 20 TABLET, FILM COATED ORAL at 08:04

## 2022-03-26 NOTE — PROGRESS NOTES
Problem: Mobility Impaired (Adult and Pediatric)  Goal: *Acute Goals and Plan of Care (Insert Text)  Note:   PHYSICAL THERAPY TREATMENT WITH DISCHARGE  Patient: Macy Cavanaugh (64 y.o. male)  Date: 3/26/2022  Diagnosis: Acute left-sided low back pain without sciatica [M54.50]  Left hip pain [M25.552]  Primary osteoarthritis of left hip [M16.12]  Chronic midline low back pain without sciatica [M54.50, G89.29]  Degenerative disc disease at L5-S1 level [M51.37] <principal problem not specified>  Procedure(s) (LRB):  LEFT TOTAL HIP ARTHROPLASTY, DIRECT ANTERIOR, MAKOPLASTY (REGIONAL BLOCK) (Left) 1 Day Post-Op  Precautions: Fall,WBAT,Total hip (anterior approach)  Chart, physical therapy assessment, plan of care and goals were reviewed. ASSESSMENT  Patient continues with skilled PT services and has progressed towards goals. Patient demonstrates CG A with all mobility but progressed to S to > MOD I  within session for transfers, ambulation, and stair climbing 8 stairs with handrail and SPC. Patient reports mild discomfort but denies significant pain. His RW sized to height and patient amb. with guarded but beginning step through gait pattern. Patient is cleared for discharge. Vitals stable today and patient asymptomatic    Other factors to consider for discharge:          PLAN :  Patient is cleared by physical therapy for discharge at this time. Rationale for discharge:  Goals achieved    Recommendation for discharge: (in order for the patient to meet his/her long term goals)  Outpatient physical therapy follow up recommended for strengthening, gait, balance    This discharge recommendation:  Has not yet been discussed the attending provider and/or case management    IF patient discharges home will need the following DME: none       SUBJECTIVE:   Patient stated I feel pretty good.     OBJECTIVE DATA SUMMARY:   Critical Behavior:  Neurologic State: Alert  Orientation Level: Oriented X4  Cognition: Appropriate decision making  Safety/Judgement: Awareness of environment  Functional Mobility Training:  Bed Mobility:     Supine to Sit: Stand-by assistance     Scooting: Modified independent        Transfers:  Sit to Stand: Contact guard assistance  Stand to Sit: Contact guard assistance        Bed to Chair: Contact guard assistance                    Balance:  Sitting: Intact  Standing: Intact; With support  Ambulation/Gait Training:  Distance (ft): 100 Feet (ft) (x 2)  Assistive Device: Gait belt;Walker, rolling  Ambulation - Level of Assistance: Modified independent        Gait Abnormalities: Antalgic     Left Side Weight Bearing: As tolerated  Base of Support: Narrowed     Speed/Nette: Slow          Stairs:  Number of Stairs Trained: 8  Stairs - Level of Assistance: Modified independent   Rail Use: Left  (and SPC)    Therapeutic Exercises:   SUPINE  EXERCISES   Sets   Reps   Active Active Assist   Passive Self ROM   Comments   Ankle Pumps   []                                        []                                        []                                        []                                           Quad Sets   []                                        []                                        []                                        []                                           Heel Slides   []                                        []                                        []                                        []                                           Hip Abduction   []                                        []                                        []                                        []                                           Glut Sets   []                                        []                                        []                                        []                                              []                                        [] []                                        []                                              []                                        []                                        []                                        []                                             STANDING  EXERCISES   Sets   Reps   Active Active Assist   Passive Self ROM   Comments   Heel Raises   []                                        []                                        []                                        []                                           Hip Abduction   []                                        []                                        []                                        []                                              []                                        []                                        []                                        []                                              []                                        []                                        []                                        []                                             Pain Ratin-3/10    Activity Tolerance:   Good, SpO2 stable on RA, and requires rest breaks    After treatment patient left in no apparent distress:   Sitting in chair, Call bell within reach, and Caregiver / family present    COMMUNICATION/COLLABORATION:   The patients plan of care was discussed with: Occupational therapist and Registered nurse.      Elspeth Every, PT, DPT   Time Calculation: 27 mins

## 2022-03-26 NOTE — PROGRESS NOTES
TOTAL HIP ARTHROPLASTY DAILY NOTE     ASSESSMENT / PLAN :   1. Pain Control : Excellent - Able to sleep and participate with therapy  2. Overnight Issues : none  3. Wound or incisional issue : Healing incision with no visible drainage  4. Therapy / Weight Bearing Recommendations : Weight bear as tolerated with use of a walker and two person assist while mobilizing  5. DVT Prophylaxis : Mechanical and Xarelto and mechanical lower extremity compression device  6. Disposition : Home - outpatient PT  7. Medical Concerns : none - stable  8. Comments : Discharge home today after cleared by PT       POD  1 Day Post-Op s/p Procedure(s):  LEFT TOTAL HIP ARTHROPLASTY, DIRECT ANTERIOR, MAKOPLASTY (REGIONAL BLOCK)     SUBJECTIVE :     Concerns : none. OBJECTIVE :     Vitals:    03/26/22 1100 03/26/22 1103 03/26/22 1130 03/26/22 1512   BP: 130/75 (!) 156/70 112/68 124/70   Pulse:   66 73   Resp:   16 17   Temp:   98.5 °F (36.9 °C) 99 °F (37.2 °C)   SpO2:   96% 96%   Weight:       Height:           Alert and oriented x3. left exam of the hip reveals that the dressing is clean, dry and intact. The patient is able to fire the quadriceps / flex at the hip  Sensation is intact to light touch. No calf pain. ANTICOAGULANTS / LABS :       Key Anti-Platelet Anticoagulant Meds     The patient is on no antiplatelet meds or anticoagulants. Labs:  Recent Labs     03/26/22  1319 03/26/22  0035 03/26/22  0035   HGB 11.1*   < > 10.7*   HCT 34.7*   < > 33.2*   NA  --   --  139   K  --   --  4.3   CL  --   --  106   CO2  --   --  30   BUN  --   --  18   CREA  --   --  1.23   GLU  --   --  119*    < > = values in this interval not displayed.         Patient mobility  Gait  Base of Support: Narrowed  Speed/Nette: Slow  Gait Abnormalities: Antalgic  Ambulation - Level of Assistance: Modified independent  Distance (ft): 100 Feet (ft) (x 2)  Assistive Device: Gait belt,Walker, rolling  Rail Use: Left  (and SPC)  Stairs - Level of Assistance: Modified independent  Number of Stairs Trained: Ernesto Donald MD  Cell [phone]  Shaylee Loya PA-C  Cell [phone]  Medical Assistants: Bucky Elise [phone]                 Surgery Scheduler: [address] [phone] ext [phone]

## 2022-03-26 NOTE — PROGRESS NOTES
With morning vitals RN checked the vitals of pt while lying and his bp was 139/75 and HR was 64. Again when retaken after 5 minutes at sitting position his BP was 110/62 and HR 67 and after 10 minutes at sitting position his BP was 71/45 and HR was 55. Notified the doctor and dr. Tanya Hines for 500cc bolus of Nacl and also order to place the abdominal binder. Also ordered for an EKG.

## 2022-03-26 NOTE — PROGRESS NOTES
Chart reviewed. OT eval complete and write-up to follow. Pt's vitals stable throughout session. Vitals:    03/26/22 1054 03/26/22 1057 03/26/22 1100 03/26/22 1103   BP: 127/69 (!) 152/83 130/75 (!) 156/70   BP 1 Location: Right upper arm Right upper arm Right upper arm Right upper arm   BP Patient Position: Supine Sitting Standing Sitting; Other (Comment)  Comment: post transfer   Pulse:       Temp:       Resp:       Height:       Weight:       SpO2:

## 2022-03-26 NOTE — CONSULTS
[address] [place] [address] with [facility] and [place] [id number] [facility] [address] [place] [address]   Office [phone], Fax [phone]    Family Medicine Consult    Patient:  Wilbur Dan  MRN:  [id number]  YOB: 1965  Age:  59 y.o. Primary care provider:  Love Martin MD    Date of admission:  3/25/2022    Date of consultation:  3/26/2022    Requesting physician:  Dr. Lana Nunez                   History of present illness  Wilbur Dan is a 59 y.o. male  s/p LTHR on 3/25. Pt was tolerated the procedure well with 400cc blood loss. SF was consulted for medical management of orthostatic hypotension. Patient reports several episodes of diaphoresis and dizziness associated with positional changes starting after his procedure when he worked with PT. Patient's BP noted to be 68/45 at that time. Patient then had another similar episode when sitting at the edge of bed and being dangled by nursing. During this episode, there was less of a BP drop with the lowest BP being 92/49. Patient has not had such episodes before. He has not had any recent medication changes. He has not been on any central blocking agents. The patient denies any fever, chills, chest pain, sob, n/v/d, cough, congestion, recent illness, palpitations, or dysuria. Pain well controlled. Tolerating diet. Past Medical History:   Diagnosis Date    A-fib [facility])     Anticoagulated     Xarelto- followed by Dr. Amanda Low COVID-19 01/24/2022    COVID-19 vaccine series completed     Plus booster    HTN (hypertension)     Hypothyroidism     Seizure ([facility] [address] 2010    None since- No known cause      Past Surgical History:   Procedure Laterality Date    HX HERNIA REPAIR      HX VASECTOMY       History reviewed. No pertinent family history.    Social History     Tobacco Use    Smoking status: Never Smoker    Smokeless tobacco: Never Used   Substance Use Topics    Alcohol use: Yes     Alcohol/week: 3.0 standard drinks     Types: 3 Cans of beer per week       Prior to Admission Medications   Prescriptions Last Dose Informant Patient Reported? Taking? MULTIVITAMIN PO 3/23/2022  Yes No   Sig: Take 1 Tablet by mouth every other day. amiodarone (CORDARONE) 200 mg tablet 3/24/2022 at 1700  Yes Yes   Sig: Take 200 mg by mouth two (2) times a day. levothyroxine (SYNTHROID) 50 mcg tablet 3/24/2022 at 0800  Yes Yes   Sig: Take 75 mcg by mouth Daily (before breakfast). losartan (COZAAR) 25 mg tablet 3/24/2022 at 0800  Yes Yes   Sig: Take 25 mg by mouth Every morning. rivaroxaban (XARELTO) 20 mg tab tablet 3/21/2022  Yes No   Sig: Take 20 mg by mouth daily (with breakfast).       Facility-Administered Medications: None       Current Facility-Administered Medications   Medication Dose Route Frequency Provider Last Rate Last Admin    0.9% sodium chloride infusion  125 mL/hr IntraVENous CONTINUOUS Shantanu Chavarria MD        amiodarone (CORDARONE) tablet 200 mg  200 mg Oral BID Janet Small PA-C        therapeutic multivitamin [facility]) tablet 1 Tablet  1 Tablet Oral EVERY OTHER DAY Janet Small PA-C        levothyroxine (SYNTHROID) tablet 75 mcg  75 mcg Oral ACB Janet Small PA-C        sodium chloride (NS) flush 5-40 mL  5-40 mL IntraVENous Q8H BETTY MataC   10 mL at 03/25/22 2200    sodium chloride (NS) flush 5-40 mL  5-40 mL IntraVENous PRN Janet Small PA-C        acetaminophen (TYLENOL) tablet 650 mg  650 mg Oral Q6H Lisseth SANTOS PA-C   650 mg at 03/26/22 0012    oxyCODONE IR (ROXICODONE) tablet 5 mg  5 mg Oral Q3H PRN Janet Small PA-C        oxyCODONE IR (ROXICODONE) tablet 10 mg  10 mg Oral Q3H PRN Janet Small PA-C        HYDROmorphone (DILAUDID) syringe 0.5 mg  0.5 mg IntraVENous Q4H PRN Janet Small PA-C        naloxone [facility]) injection 0.4 mg  0.4 mg IntraVENous PRN Parish May, PA-C        ondansetron [facility]) injection 4 mg  4 mg IntraVENous Q4H PRN Parish May, PA-C        diphenhydrAMINE (BENADRYL) injection 12.5 mg  12.5 mg IntraVENous Q6H PRN Parish May, PA-C        famotidine (PEPCID) tablet 20 mg  20 mg Oral BID Parish May, PA-C        senna-docusate (PERICOLACE) 8.6-50 mg per tablet 1 Tablet  1 Tablet Oral BID Parish May, PA-C   1 Tablet at 03/25/22 1739    polyethylene glycol (MIRALAX) packet 17 g  17 g Oral DAILY Parish May, PA-RODRIGUEZ        [START ON 3/27/2022] bisacodyL (DULCOLAX) suppository 10 mg  10 mg Rectal DAILY PRN Parish May, PA-C        pregabalin (LYRICA) capsule 75 mg  75 mg Oral QHS Parish May, PA-C   75 mg at 03/25/22 2235    rivaroxaban (XARELTO) tablet 20 mg  20 mg Oral DAILY WITH BREAKFAST Parish May, PA-RODRIGUEZ            No Known Allergies      Immunization History   Administered Date(s) Administered    COVID-19, Haily Rey, Primary or Immunocompromised Series, MRNA, PF, 100mcg/0.5mL 02/26/2021, 03/26/2021    COVID-19, Pfizer Purple top, DILUTE for use, 12+ yrs, 30mcg/0.3mL dose 11/30/2021         Review of systems  A comprehensive review of systems was negative except for that written in the History of Present Illness. The remainder of the review of systems was reviewed and is non-contributory.     Physical Examination   Visit Vitals  /75 (BP 1 Location: Left upper arm, BP Patient Position: Sitting)   Pulse 63   Temp 97.7 °F (36.5 °C)   Resp 16   Ht 5' 11\" (1.803 m)   Wt 197 lb 8.5 oz (89.6 kg)   SpO2 94%   BMI 27.55 kg/m²       Intake and Output:    03/25 1901 - 03/26 0700  In: -   Out: 500 [Urine:500]  03/24 0701 - 03/25 1900  In: 5895 [P.O.:360; I.V.:2220]  Out: 450     Visit Vitals  /75 (BP 1 Location: Left upper arm, BP Patient Position: Sitting)   Pulse 63   Temp 97.7 °F (36.5 °C)   Resp 16   Ht 5' 11\" (1.803 m)   Wt 197 lb 8.5 oz (89.6 kg)   SpO2 94% BMI 27.55 kg/m²     General:  Alert, cooperative, no distress, appears stated age. Head:  Normocephalic, without obvious abnormality, atraumatic. Eyes:  Conjunctivae/corneas clear. Throat: Lips, mucosa, and tongue normal.    Neck: Supple, symmetrical, trachea midline, no adenopathy, thyroid: no enlargement/tenderness/nodules, no carotid bruit and no JVD. Back:   Symmetric, no curvature. ROM normal. No CVA tenderness. Lungs:   Clear to auscultation bilaterally. Chest wall:  No tenderness or deformity. Heart:  Regular rate and rhythm, S1, S2 normal, no murmur, click, rub or gallop. Abdomen:   Soft, non-tender. Bowel sounds normal. No masses,  No organomegaly. Extremities: s/p LTHR, dressing c/d/i, ROM in tact, SCDs in place   Pulses: 2+ and symmetric all extremities. Skin: Skin color, texture, turgor normal. No rashes or lesions. Neurologic: AAOx3, no focal deficits, sensation intact       Data Review:   No results found for this or any previous visit (from the past 24 hour(s)). 24 Hour Results:  No results found for this or any previous visit (from the past 24 hour(s)). No results for input(s): WBC, HGB, HCT, PLT, HGBEXT, HCTEXT, PLTEXT, HGBEXT, HCTEXT, PLTEXT in the last 72 hours. No results for input(s): NA, K, CL, CO2, GLU, BUN, CREA, CA, MG, PHOS, ALB, TBIL, ALT, INR, INREXT, INREXT in the last 72 hours. No lab exists for component: SGOT      Impression/Recomendations   Lillian Chen is a 59 y.o. male s/p LTHR on 3/25. The [address] [place] [address] [facility] team was consulted for medical management. S/p LTHR: Management per Ortho    Orthostatic HTN: Possibly 2/2 residual spinal anesthesia effect vs IVVD in s/o blood loss vs autonomic dysfunction. Without reflex tachycardic, however rate-controlled in s/o Amiodarone use. Diaphoresis and dizziness associated with BP drop (129/71>106/67>92/49). Also notable drop to 68/45 when working PT after procedure. HR 50-60s at baseline.  EKG at pre-op testing with sinus zoran at 47, QTc 470. S/p NS 500cc bolus x2 with some improvement. Post-op Hgb 10.7 (pre op 15.2). - Daily CBC/BMP  - Repeat EKG  - Continue MIVF  - Encourage PO hydration  - Compression stockings, abdominal binder   - Can add additional fluid boluses prn   - Consider pharmacologic treatment if persistent symptoms     Afib: Rate controlled. Preop EKG with sinus zoran at 47.   - Home Amiodarone 200mg bid - hold next dose in s/o bradycardia   - Continue home Xarelto  - Repeat EKG    Hypothyroidism: Continue home Synthroid  - Repeat TSH    Pain Management: Per Ortho    DVT Prophylaxis: Per Ortho    Disposition: Per Ortho     Thank you very much for allowing us to participate in the care of this pleasant patient. The family medicine service will continue to follow the patient's medical progress along with you. Please do not hesitate to page us at [phone] with any questions or concerns.       Signed by:     Maddy Santa DO   Family Medicine Resident    March 26, 2022 at 12:02 AM

## 2022-03-26 NOTE — PROGRESS NOTES
Problem: Falls - Risk of  Goal: *Absence of Falls  Description: Document Sher Moura Fall Risk and appropriate interventions in the flowsheet.   Outcome: Progressing Towards Goal  Note: Fall Risk Interventions:  Mobility Interventions: Bed/chair exit alarm,Patient to call before getting OOB         Medication Interventions: Bed/chair exit alarm,Evaluate medications/consider consulting pharmacy,Patient to call before getting OOB,Teach patient to arise slowly,Utilize gait belt for transfers/ambulation                   Problem: Patient Education: Go to Patient Education Activity  Goal: Patient/Family Education  Outcome: Progressing Towards Goal     Problem: Patient Education: Go to Patient Education Activity  Goal: Patient/Family Education  Outcome: Progressing Towards Goal

## 2022-03-26 NOTE — PROGRESS NOTES
Problem: Self Care Deficits Care Plan (Adult)  Goal: *Acute Goals and Plan of Care (Insert Text)  Description: FUNCTIONAL STATUS PRIOR TO ADMISSION: Patient was independent and active without use of DME.    HOME SUPPORT: The patient lived with spouse but did not require assist.    Occupational Therapy Goals  Initiated 3/26/2022     1. Patient will perform lower body dressing using Reacher, Stocking Aid, and Long [employer] at modified independence level within 7 days. 2. Patient will perform toilet transfers at modified independence level using Walkers, Type: Rolling Walker  within 7 days. 3. Patient will perform all aspects of toileting at modified independence level within 7 days. 4. Patient will demonstrate hip precautions without cues within 7 days. 5. Patient will perform shower transfer at mod I within 7 days. Outcome: Progressing Towards Goal   OCCUPATIONAL THERAPY EVALUATION  Patient: Rosanne Aaron (13 y.o. male)  Date: 3/26/2022  Primary Diagnosis: Acute left-sided low back pain without sciatica [M54.50]  Left hip pain [M25.552]  Primary osteoarthritis of left hip [M16.12]  Chronic midline low back pain without sciatica [M54.50, G89.29]  Degenerative disc disease at L5-S1 level [M51.37]  Procedure(s) (LRB):  LEFT TOTAL HIP ARTHROPLASTY, DIRECT ANTERIOR, MAKOPLASTY (REGIONAL BLOCK) (Left) 1 Day Post-Op   Precautions:   Fall    ASSESSMENT  Based on the objective data described below, the patient presents with decrease mobility and impaired activity tolerance d/t low BP. Pt received bolus last night. Supportive wife present in the room. BP remained stable throughout session. Reviewed hip precautions hand-out and practiced use of AE. Demonstrated and verbalized understanding. Feel pt would benefit from one more OT session to practice shower transfer and use of AE. Will con't to follow. Current Level of Function Impacting Discharge (ADLs/self-care):  CGA with transfers, bathroom mobility and min to mod assist with LB dressing    Functional Outcome Measure: The patient scored 70/100 on the Barthel outcome measure which is indicative of impairment. Other factors to consider for discharge: none noted     Patient will benefit from skilled therapy intervention to address the above noted impairments. PLAN :  Recommendations and Planned Interventions: self care training, functional mobility training, therapeutic activities, patient education, home safety training, and family training/education    Frequency/Duration: Patient will be followed by occupational therapy 5 times a week to address goals. Recommendation for discharge: (in order for the patient to meet his/her long term goals)  No skilled occupational therapy/ follow up rehabilitation needs identified at this time. This discharge recommendation:  A follow-up discussion with the attending provider and/or case management is planned    IF patient discharges home will need the following DME: LATASHA (pt has his own in room)       SUBJECTIVE:   Patient stated I feel better today.     OBJECTIVE DATA SUMMARY:   HISTORY:   Past Medical History:   Diagnosis Date    [facility]     Anticoagulated     Xarelto- followed by Dr. Osorio COVID-19 01/24/2022    COVID-19 vaccine series completed     Plus booster    HTN (hypertension)     Hypothyroidism     Seizure ([facility] 2010    None since- No known cause     Past Surgical History:   Procedure Laterality Date    HX HERNIA REPAIR      HX VASECTOMY         Expanded or extensive additional review of patient history:     Home Situation  Home Environment: Private residence  # Steps to Enter: 4  Rails to Enter: Yes  Hand Rails : Left  One/Two Story Residence: Two story, live on 1st floor  # of Interior Steps: 14  Interior Rails: Both  Lift Chair Available: No  Living Alone: No  Support Systems: Spouse/Significant Other  Patient Expects to be Discharged to[de-identified] Home with outpatient services  Current DME Used/Available at Home: Cane, straight,Walker, rolling  Tub or Shower Type: Shower        EXAMINATION OF PERFORMANCE DEFICITS:  Cognitive/Behavioral Status:  Neurologic State: Alert  Orientation Level: Oriented X4  Cognition: Appropriate decision making        Safety/Judgement: Awareness of environment    Skin: intact    Edema: none noted    Hearing: Auditory  Auditory Impairment: None    Vision/Perceptual:                                Corrective Lenses: Glasses    Range of Motion:    AROM: Within functional limits                         Strength:    Strength: Within functional limits                Coordination:  Coordination: Within functional limits  Fine Motor Skills-Upper: Left Intact; Right Intact    Gross Motor Skills-Upper: Left Intact; Right Intact    Tone & Sensation:    Tone: Normal  Sensation: Intact                      Balance:  Sitting: Intact  Standing: Intact; With support    Functional Mobility and Transfers for ADLs:  Bed Mobility:  Supine to Sit: Stand-by assistance  Scooting: Modified independent    Transfers:  Sit to Stand: Contact guard assistance  Stand to Sit: Contact guard assistance  Bed to Chair: Contact guard assistance  Bathroom Mobility: Contact guard assistance  Assistive Device : Walker, rolling    ADL Assessment:  Feeding: Independent    Oral Facial Hygiene/Grooming: Setup    Bathing: Minimum assistance    Upper Body Dressing: Independent    Lower Body Dressing: Minimum assistance; Additional time; Adaptive equipment    Toileting: Stand by assistance; Other (comment)                ADL Intervention and task modifications:         Cognitive Retraining  Safety/Judgement: Awareness of environment      Functional Measure:    Barthel Index:  Bathin  Bladder: 10  Bowels: 10  Groomin  Dressin  Feeding: 10  Mobility: 10  Stairs: 0  Toilet Use: 10  Transfer (Bed to Chair and Back): 10  Total: 70/100      The Barthel ADL Index: Guidelines  1.  The index should be used as a record of what a patient does, not as a record of what a patient could do. 2. The main aim is to establish degree of independence from any help, physical or verbal, however minor and for whatever reason. 3. The need for supervision renders the patient not independent. 4. A patient's performance should be established using the best available evidence. Asking the patient, friends/relatives and nurses are the usual sources, but direct observation and common sense are also important. However direct testing is not needed. 5. Usually the patient's performance over the preceding 24-48 hours is important, but occasionally longer periods will be relevant. 6. Middle categories imply that the patient supplies over 50 per cent of the effort. 7. Use of aids to be independent is allowed. Score Interpretation (from [address] [facility])    Independent   60-79 Minimally independent   40-59 Partially dependent   20-39 Very dependent   <20 Totally dependent     -Thais Soto., BarthelNAVNEET. (1965). Functional evaluation: the Barthel Index. [address] [facility] ([address] (1997). The Barthel activities of daily living index: self-reporting versus actual performance in the old (> or = 75 years). Journal of [address] [place] [address] 45(7), [facility], J.BobM.F, Winston Willoughby., Imani See. (1999). Measuring the change in disability after inpatient rehabilitation; comparison of the responsiveness of the Barthel Index and Functional Wheeler Measure. Journal of Neurology, Neurosurgery, and Psychiatry, 66(4), 996-137. SOCO Villagomez.[email or web address].A, LAURYN Loya, & Agueda Henao M.A. (2004) Assessment of post-stroke quality of life in cost-effectiveness studies: The usefulness of the Barthel Index and the EuroQoL-5D.  Quality of Life Research, 15, 782-21        Occupational Therapy Evaluation Charge Determination   History Examination Decision-Making   LOW Complexity : Brief history review  LOW Complexity : 1-3 performance deficits relating to physical, cognitive , or psychosocial skils that result in activity limitations and / or participation restrictions  LOW Complexity : No comorbidities that affect functional and no verbal or physical assistance needed to complete eval tasks       Based on the above components, the patient evaluation is determined to be of the following complexity level: LOW   Pain Rating:  No c/o pain    Activity Tolerance:   Good    After treatment patient left in no apparent distress:    Sitting in chair, Call bell within reach, and Caregiver / family present    COMMUNICATION/EDUCATION:   The patients plan of care was discussed with: Physical therapist and Registered nurse. Home safety education was provided and the patient/caregiver indicated understanding., Patient/family have participated as able in goal setting and plan of care. , and Patient/family agree to work toward stated goals and plan of care. This patients plan of care is appropriate for delegation to [facility].     Thank you for this referral.  De Cerna OTR/L  Time Calculation: 27 mins

## 2022-03-26 NOTE — PROGRESS NOTES
Patient was dangled at the side of the bed and he felt diaphoretic and we checked the bp and it was 129/71. And when retaken his bp was 106/67 and again it dropped to 92/49. Pt was experiencing orthostatic hypotension so notified the ortho doctor. Dr. Taty Elizabeth ordered for 500cc bolus of NACL and put in consult for Family Medicine. Continue to monitor his BP.

## 2022-03-27 LAB
ATRIAL RATE: 64 BPM
ATRIAL RATE: 65 BPM
CALCULATED P AXIS, ECG09: 50 DEGREES
CALCULATED P AXIS, ECG09: 59 DEGREES
CALCULATED R AXIS, ECG10: 5 DEGREES
CALCULATED R AXIS, ECG10: 9 DEGREES
CALCULATED T AXIS, ECG11: 42 DEGREES
CALCULATED T AXIS, ECG11: 99 DEGREES
DIAGNOSIS, 93000: NORMAL
DIAGNOSIS, 93000: NORMAL
P-R INTERVAL, ECG05: 188 MS
P-R INTERVAL, ECG05: 188 MS
Q-T INTERVAL, ECG07: 412 MS
Q-T INTERVAL, ECG07: 430 MS
QRS DURATION, ECG06: 90 MS
QRS DURATION, ECG06: 92 MS
QTC CALCULATION (BEZET), ECG08: 425 MS
QTC CALCULATION (BEZET), ECG08: 447 MS
VENTRICULAR RATE, ECG03: 64 BPM
VENTRICULAR RATE, ECG03: 65 BPM

## 2022-03-30 ENCOUNTER — APPOINTMENT (OUTPATIENT)
Dept: GENERAL RADIOLOGY | Age: 57
End: 2022-03-30
Attending: EMERGENCY MEDICINE
Payer: COMMERCIAL

## 2022-03-30 ENCOUNTER — APPOINTMENT (OUTPATIENT)
Dept: CT IMAGING | Age: 57
End: 2022-03-30
Attending: EMERGENCY MEDICINE
Payer: COMMERCIAL

## 2022-03-30 ENCOUNTER — TELEPHONE (OUTPATIENT)
Dept: SURGERY | Age: 57
End: 2022-03-30

## 2022-03-30 ENCOUNTER — HOSPITAL ENCOUNTER (EMERGENCY)
Age: 57
Discharge: HOME | End: 2022-03-30
Attending: EMERGENCY MEDICINE
Payer: COMMERCIAL

## 2022-03-30 VITALS
HEIGHT: 71 IN | SYSTOLIC BLOOD PRESSURE: 133 MMHG | WEIGHT: 190 LBS | BODY MASS INDEX: 26.6 KG/M2 | RESPIRATION RATE: 16 BRPM | DIASTOLIC BLOOD PRESSURE: 75 MMHG | OXYGEN SATURATION: 97 % | HEART RATE: 64 BPM | TEMPERATURE: 97.9 F

## 2022-03-30 DIAGNOSIS — R06.02 SOB (SHORTNESS OF BREATH): Primary | ICD-10-CM

## 2022-03-30 LAB
ALBUMIN SERPL-MCNC: 3 G/DL (ref 3.5–5)
ALBUMIN/GLOB SERPL: 0.8 {RATIO} (ref 1.1–2.2)
ALP SERPL-CCNC: 59 U/L (ref 45–117)
ALT SERPL-CCNC: 40 U/L (ref 12–78)
ANION GAP SERPL CALC-SCNC: 4 MMOL/L (ref 5–15)
AST SERPL-CCNC: 32 U/L (ref 15–37)
BASOPHILS # BLD: 0 K/UL (ref 0–0.1)
BASOPHILS NFR BLD: 1 % (ref 0–1)
BILIRUB SERPL-MCNC: 0.5 MG/DL (ref 0.2–1)
BUN SERPL-MCNC: 11 MG/DL (ref 6–20)
BUN/CREAT SERPL: 10 (ref 12–20)
CALCIUM SERPL-MCNC: 8.7 MG/DL (ref 8.5–10.1)
CHLORIDE SERPL-SCNC: 105 MMOL/L (ref 97–108)
CO2 SERPL-SCNC: 31 MMOL/L (ref 21–32)
COMMENT, HOLDF: NORMAL
CREAT SERPL-MCNC: 1.13 MG/DL (ref 0.7–1.3)
D DIMER PPP FEU-MCNC: 3.9 MG/L FEU (ref 0–0.65)
DIFFERENTIAL METHOD BLD: ABNORMAL
EOSINOPHIL # BLD: 0.1 K/UL (ref 0–0.4)
EOSINOPHIL NFR BLD: 2 % (ref 0–7)
ERYTHROCYTE [DISTWIDTH] IN BLOOD BY AUTOMATED COUNT: 13.1 % (ref 11.5–14.5)
GLOBULIN SER CALC-MCNC: 4 G/DL (ref 2–4)
GLUCOSE SERPL-MCNC: 117 MG/DL (ref 65–100)
HCT VFR BLD AUTO: 33.2 % (ref 36.6–50.3)
HGB BLD-MCNC: 10.9 G/DL (ref 12.1–17)
IMM GRANULOCYTES # BLD AUTO: 0 K/UL (ref 0–0.04)
IMM GRANULOCYTES NFR BLD AUTO: 0 % (ref 0–0.5)
LYMPHOCYTES # BLD: 1.2 K/UL (ref 0.8–3.5)
LYMPHOCYTES NFR BLD: 18 % (ref 12–49)
MCH RBC QN AUTO: 32.1 PG (ref 26–34)
MCHC RBC AUTO-ENTMCNC: 32.8 G/DL (ref 30–36.5)
MCV RBC AUTO: 97.6 FL (ref 80–99)
MONOCYTES # BLD: 0.7 K/UL (ref 0–1)
MONOCYTES NFR BLD: 10 % (ref 5–13)
NEUTS SEG # BLD: 4.8 K/UL (ref 1.8–8)
NEUTS SEG NFR BLD: 69 % (ref 32–75)
NRBC # BLD: 0 K/UL (ref 0–0.01)
NRBC BLD-RTO: 0 PER 100 WBC
PLATELET # BLD AUTO: 212 K/UL (ref 150–400)
PMV BLD AUTO: 9.7 FL (ref 8.9–12.9)
POTASSIUM SERPL-SCNC: 4.1 MMOL/L (ref 3.5–5.1)
PROT SERPL-MCNC: 7 G/DL (ref 6.4–8.2)
RBC # BLD AUTO: 3.4 M/UL (ref 4.1–5.7)
SAMPLES BEING HELD,HOLD: NORMAL
SODIUM SERPL-SCNC: 140 MMOL/L (ref 136–145)
TROPONIN-HIGH SENSITIVITY: 9 NG/L (ref 0–76)
TROPONIN-HIGH SENSITIVITY: 9 NG/L (ref 0–76)
WBC # BLD AUTO: 6.9 K/UL (ref 4.1–11.1)

## 2022-03-30 PROCEDURE — 93005 ELECTROCARDIOGRAM TRACING: CPT

## 2022-03-30 PROCEDURE — 99285 EMERGENCY DEPT VISIT HI MDM: CPT

## 2022-03-30 PROCEDURE — 71275 CT ANGIOGRAPHY CHEST: CPT

## 2022-03-30 PROCEDURE — 80053 COMPREHEN METABOLIC PANEL: CPT

## 2022-03-30 PROCEDURE — 85025 COMPLETE CBC W/AUTO DIFF WBC: CPT

## 2022-03-30 PROCEDURE — 71045 X-RAY EXAM CHEST 1 VIEW: CPT

## 2022-03-30 PROCEDURE — 85379 FIBRIN DEGRADATION QUANT: CPT

## 2022-03-30 PROCEDURE — 84484 ASSAY OF TROPONIN QUANT: CPT

## 2022-03-30 PROCEDURE — 36415 COLL VENOUS BLD VENIPUNCTURE: CPT

## 2022-03-30 PROCEDURE — 74011000636 HC RX REV CODE- 636: Performed by: RADIOLOGY

## 2022-03-30 RX ADMIN — IOPAMIDOL 90 ML: 755 INJECTION, SOLUTION INTRAVENOUS at 03:05

## 2022-03-30 NOTE — ED TRIAGE NOTES
Patient stated he was sleeping when he started feeling \"claustropahic and trouble breathing\". Some chest discomfort when breathing. Recent hip replacement surgery on Friday 3/25.

## 2022-03-30 NOTE — TELEPHONE ENCOUNTER
Post Discharge Phone placed to patient after Joint Replacement surgery   Spoke with patient  He went to ED last night for SOB, no PE found, stopped lyrica r/t SOB   Pain medications patient is taking tylenol  States pain is minimal and medication is effective.    Patient was able to fill prescriptions, no medication questions    Discharge medications reviewed  States discharge instructions were clear and easy to understand has no questions  Patient is using a walker without difficulty  Walking hourly while awake  Able to do exercises regularly  Bruising is  minimal   Swelling is  moderate  Patient is elevating leg when resting  Using ice with good relief  States dressing is intact without drainage  Removal education provided  Shower instructions reviewed  Denies N/V, appetite is good  Education provided  Constipation is mild, +flatus, +BM  No Difficulty urinating  Follow up appointment is scheduled with surgeon   PT is scheduled Friday  Denies fever, cough  Denies chest pain   Denies any unusual symptoms  Discussed calling surgeon or PCP for concerns  Reminded patient to fill out survey  Opportunity given for patient to ask questions  Call duration 16:10 minutes

## 2022-03-30 NOTE — ED PROVIDER NOTES
52-year-old male with a history of atrial fibrillation, recent hip replacement presents with shortness of breath and chest tightness that started around 8 PM last night. He states that it lasted about 3 hours. Is currently resolved. He states that he was sleeping in a hoodie and couple shirts and usually only sleeps in boxers. He states also that his wife was leaning against him. He felt like he was \"suffocating\". His symptoms have now resolved. He denies any leg swelling. Denies any leg pain. He denies any current pain. He complained of some chest discomfort while breathing. Shortness of Breath         Past Medical History:   Diagnosis Date    A-fib [facility])     Anticoagulated     Xarelto- followed by Dr. Mark Alvarez COVID-19 01/24/2022    COVID-19 vaccine series completed     Plus booster    HTN (hypertension)     Hypothyroidism     Seizure ([facility] [address] 2010    None since- No known cause       Past Surgical History:   Procedure Laterality Date    HX HERNIA REPAIR      HX VASECTOMY           No family history on file. Social History     Socioeconomic History    Marital status:      Spouse name: Not on file    Number of children: Not on file    Years of education: Not on file    Highest education level: Not on file   Occupational History    Not on file   Tobacco Use    Smoking status: Never Smoker    Smokeless tobacco: Never Used   Substance and Sexual Activity    Alcohol use:  Yes     Alcohol/week: 3.0 standard drinks     Types: 3 Cans of beer per week    Drug use: No    Sexual activity: Not on file   Other Topics Concern    Not on file   Social History Narrative    Not on file     Social Determinants of Health     Financial Resource Strain:     Difficulty of Paying Living Expenses: Not on file   Food Insecurity:     Worried About Running Out of Food in the Last Year: Not on file    Eboni of Food in the Last Year: Not on file   Transportation Needs:     Lack of Transportation (Medical): Not on file    Lack of Transportation (Non-Medical): Not on file   Physical Activity:     Days of Exercise per Week: Not on file    Minutes of Exercise per Session: Not on file   Stress:     Feeling of Stress : Not on file   Social Connections:     Frequency of Communication with Friends and Family: Not on file    Frequency of Social Gatherings with Friends and Family: Not on file    Attends [employer] Services: Not on file    Active Member of [address] [place] [address] [employer] or Organizations: Not on file    Attends Club or Organization Meetings: Not on file    Marital Status: Not on file   Intimate Partner Violence:     Fear of Current or Ex-Partner: Not on file    Emotionally Abused: Not on file    Physically Abused: Not on file    Sexually Abused: Not on file   Housing Stability:     Unable to Pay for Housing in the Last Year: Not on file    Number of [place] in the Last Year: Not on file    Unstable Housing in the Last Year: Not on file         ALLERGIES: Patient has no known allergies. Review of Systems   Respiratory: Positive for shortness of breath. All other systems reviewed and are negative. Vitals:    03/30/22 0146   BP: (!) 170/88   Pulse: 68   Resp: 20   Temp: 98.5 °F (36.9 °C)   SpO2: 97%   Weight: 86.2 kg (190 lb)   Height: 5' 11\" (1.803 m)            Physical Exam  Vitals and nursing note reviewed. Constitutional:       General: He is not in acute distress. HENT:      Head: Normocephalic and atraumatic. Eyes:      General: No scleral icterus. Conjunctiva/sclera: Conjunctivae normal.      Pupils: Pupils are equal, round, and reactive to light. Neck:      Trachea: No tracheal deviation. Cardiovascular:      Rate and Rhythm: Normal rate and regular rhythm. Pulmonary:      Effort: Pulmonary effort is normal. No respiratory distress. Breath sounds: Normal breath sounds. No stridor. Abdominal:      General: There is no distension. Palpations: Abdomen is soft. Tenderness: There is no abdominal tenderness. Genitourinary:     Comments: deferred  Musculoskeletal:         General: No deformity. Cervical back: No rigidity. Right lower leg: No edema. Left lower leg: No edema. Skin:     General: Skin is warm and dry. Neurological:      General: No focal deficit present. Mental Status: He is alert. Psychiatric:         Mood and Affect: Mood normal.         Behavior: Behavior normal.          [facility]  ED Course as of 03/30/22 0500   Wed Mar 30, 2022   187 64-year-old male with a history of recent hip replacement presents with shortness of breath and chest discomfort. Differential diagnosis includes ACS, PE, nonspecific chest pain and shortness of breath. His vital signs are normal.  He is not tachycardic or hypoxic making PE less likely. His EKG is normal.  It shows normal sinus rhythm at rate of 64, normal intervals, normal axis, normal ST segments and T waves. [TT]   0218 D-dimer(!): 3.90  D-dimer returned elevated. Will order CTA. [TT]   0404 Patient's oxygen dips while laying flat and sleeping. Suspect underlying sleep apnea. [TT]      ED Course User Index  [TT] Valorie Fung MD       Procedures          5:00 AM  Patient re-evaluated. Patient resting comfortably. All questions answered. Patient appropriate for discharge. Given return precautions and follow up instructions.      LABORATORY TESTS:  Labs Reviewed   CBC WITH AUTOMATED DIFF - Abnormal; Notable for the following components:       Result Value    RBC 3.40 (*)     HGB 10.9 (*)     HCT 33.2 (*)     All other components within normal limits   METABOLIC PANEL, COMPREHENSIVE - Abnormal; Notable for the following components:    Anion gap 4 (*)     Glucose 117 (*)     BUN/Creatinine ratio 10 (*)     Albumin 3.0 (*)     A-G Ratio 0.8 (*)     All other components within normal limits   D DIMER - Abnormal; Notable for the following components:    D-dimer 3.90 (*)     All other components within normal limits   TROPONIN-HIGH SENSITIVITY   SAMPLES BEING HELD   TROPONIN-HIGH SENSITIVITY       IMAGING RESULTS:  CTA CHEST W OR W WO CONT   Final Result   No acute process. XR CHEST PORT   Final Result   No acute process. MEDICATIONS GIVEN:  Medications   iopamidoL (ISOVUE-370) 76 % injection 100 mL (90 mL IntraVENous Given 3/30/22 0305)       IMPRESSION:  1. SOB (shortness of breath)        PLAN:  1. Current Discharge Medication List        2. Follow-up Information     Follow up With Specialties Details Why Contact Info    Garlin Dance, MD Pulmonary Disease Schedule an appointment as soon as possible for a visit  for possible sleep apnea [address] [facility]      Sintia Sales MD Cardiology  chest pain [address] [facility] [address]  [phone]      [facility] EMERGENCY DEPT Emergency Medicine  If symptoms worsen or new concerns [address] [place] [address]  [phone]        3. Return to ED for new or worsening symptoms       Pernell Mckenzie. Raffi Trevino MD        Please note that this dictation was completed with [employer], the computer voice recognition software. Quite often unanticipated grammatical, syntax, homophones, and other interpretive errors are inadvertently transcribed by the computer software. Please disregard these errors. Please excuse any errors that have escaped final proofreading.

## 2022-03-31 LAB
ATRIAL RATE: 64 BPM
CALCULATED P AXIS, ECG09: 45 DEGREES
CALCULATED R AXIS, ECG10: 7 DEGREES
CALCULATED T AXIS, ECG11: 31 DEGREES
DIAGNOSIS, 93000: NORMAL
P-R INTERVAL, ECG05: 182 MS
Q-T INTERVAL, ECG07: 436 MS
QRS DURATION, ECG06: 86 MS
QTC CALCULATION (BEZET), ECG08: 449 MS
VENTRICULAR RATE, ECG03: 64 BPM

## 2022-07-13 ENCOUNTER — TRANSCRIBE ORDER (OUTPATIENT)
Dept: SCHEDULING | Age: 57
End: 2022-07-13

## 2022-07-13 DIAGNOSIS — N50.89 MASS OF TESTICLE: Primary | ICD-10-CM

## 2022-07-22 ENCOUNTER — HOSPITAL ENCOUNTER (OUTPATIENT)
Dept: ULTRASOUND IMAGING | Age: 57
Discharge: HOME | End: 2022-07-22
Attending: FAMILY MEDICINE
Payer: COMMERCIAL

## 2022-07-22 DIAGNOSIS — N50.89 MASS OF TESTICLE: ICD-10-CM

## 2022-07-22 PROCEDURE — 76870 US EXAM SCROTUM: CPT

## (undated) DEVICE — SUTURE VCRL SZ 2-0 L36IN ABSRB UD L36MM CT-1 1/2 CIR J945H

## (undated) DEVICE — TOTAL JOINT-SFMC: Brand: MEDLINE INDUSTRIES, INC.

## (undated) DEVICE — DRESSING FOAM W4XL10IN AG SIL ADH ANTIMIC POSTOP OPTIFOAM

## (undated) DEVICE — GLOVE SURG SZ 85 L12IN FNGR ORTHO 126MIL CRM LTX FREE

## (undated) DEVICE — STRYKER PERFORMANCE SERIES SAGITTAL BLADE: Brand: STRYKER PERFORMANCE SERIES

## (undated) DEVICE — ELECTRODE BLDE L4IN NONINSULATED EDGE

## (undated) DEVICE — 3M™ TEGADERM™ TRANSPARENT FILM DRESSING FRAME STYLE, 1626W, 4 IN X 4-3/4 IN (10 CM X 12 CM), 50/CT 4CT/CASE: Brand: 3M™ TEGADERM™

## (undated) DEVICE — PREP KIT PEEL PTCH POVIDONE IOD

## (undated) DEVICE — SUTURE MCRYL SZ 3-0 L27IN ABSRB UD L24MM PS-1 3/8 CIR PRIM Y936H

## (undated) DEVICE — DERMABOND SKIN ADH 0.7ML -- DERMABOND ADVANCED 12/BX

## (undated) DEVICE — SOLUTION IRRIG 3000ML 0.9% SOD CHL USP UROMATIC PLAS CONT

## (undated) DEVICE — SUTURE VCRL + SZ 1-0 L36IN ABSRB UD CTX 1/2 CIR TAPR PNT VCP977H

## (undated) DEVICE — 3M™ TEGADERM™ TRANSPARENT FILM DRESSING FRAME STYLE, 1624W, 2-3/8 IN X 2-3/4 IN (6 CM X 7 CM), 100/CT 4CT/CASE: Brand: 3M™ TEGADERM™

## (undated) DEVICE — KIT POS FOAM HANA TBL

## (undated) DEVICE — PIN FIX 4X170MM STRL -- 2/PK MAKO

## (undated) DEVICE — 6619 2 PTNT ISO SYS INCISE AREA&LT;(&GT;&&LT;)&GT;P: Brand: STERI-DRAPE™ IOBAN™ 2

## (undated) DEVICE — SPONGE GZ W4XL4IN COT 12 PLY TYP VII WVN C FLD DSGN

## (undated) DEVICE — MARKER RAD KNEE TIB CKPT STEREOTAXIC IMAG LESION LOC

## (undated) DEVICE — DUAL IRRIGATION ADAPTOR

## (undated) DEVICE — SHEET, DRAPE, SPLIT, STERILE: Brand: MEDLINE

## (undated) DEVICE — SYR LR LCK 1ML GRAD NSAF 30ML --

## (undated) DEVICE — GLOVE SURG SZ 9 L12IN FNGR THK79MIL GRN LTX FREE

## (undated) DEVICE — BLUNTFILL: Brand: MONOJECT

## (undated) DEVICE — Device

## (undated) DEVICE — 4-PORT MANIFOLD: Brand: NEPTUNE 2

## (undated) DEVICE — GLOVE SURG SZ 85 L12IN FNGR THK79MIL GRN LTX FREE

## (undated) DEVICE — Device: Brand: JELCO

## (undated) DEVICE — KIT DRP FOR RIO ROBOTIC ARM ASST SYS

## (undated) DEVICE — 3M™ STERI-DRAPE™ U-DRAPE 1015: Brand: STERI-DRAPE™

## (undated) DEVICE — KIT TRK HIP PROC VIZADISC

## (undated) DEVICE — HOOD: Brand: FLYTE

## (undated) DEVICE — PAD,NON-ADHERENT,W/AD,3X4,ST,LF,1/PK: Brand: MEDLINE

## (undated) DEVICE — SUTURE STRATAFIX SYMMETRIC SZ 1 L18IN ABSRB VLT CT1 L36CM SXPP1A404